# Patient Record
Sex: MALE | Race: BLACK OR AFRICAN AMERICAN | Employment: OTHER | URBAN - METROPOLITAN AREA
[De-identification: names, ages, dates, MRNs, and addresses within clinical notes are randomized per-mention and may not be internally consistent; named-entity substitution may affect disease eponyms.]

---

## 2020-09-25 ENCOUNTER — HOSPITAL ENCOUNTER (INPATIENT)
Facility: HOSPITAL | Age: 60
LOS: 12 days | Discharge: NURSING, REHAB OR LONG-TERM CARE | DRG: 377 | End: 2020-10-07
Attending: EMERGENCY MEDICINE | Admitting: INTERNAL MEDICINE
Payer: MEDICARE

## 2020-09-25 ENCOUNTER — APPOINTMENT (OUTPATIENT)
Dept: CT IMAGING | Facility: HOSPITAL | Age: 60
DRG: 377 | End: 2020-09-25
Attending: EMERGENCY MEDICINE
Payer: MEDICARE

## 2020-09-25 ENCOUNTER — APPOINTMENT (OUTPATIENT)
Dept: GENERAL RADIOLOGY | Facility: HOSPITAL | Age: 60
DRG: 377 | End: 2020-09-25
Attending: EMERGENCY MEDICINE
Payer: MEDICARE

## 2020-09-25 DIAGNOSIS — Z71.89 COUNSELING REGARDING ADVANCE CARE PLANNING AND GOALS OF CARE: ICD-10-CM

## 2020-09-25 DIAGNOSIS — K92.1 MELENA: ICD-10-CM

## 2020-09-25 DIAGNOSIS — I95.9 HYPOTENSION, UNSPECIFIED HYPOTENSION TYPE: ICD-10-CM

## 2020-09-25 DIAGNOSIS — C22.0 HEPATOCELLULAR CARCINOMA (HCC): ICD-10-CM

## 2020-09-25 DIAGNOSIS — K76.9 LIVER DISEASE: ICD-10-CM

## 2020-09-25 DIAGNOSIS — K72.10 CHRONIC LIVER FAILURE WITHOUT HEPATIC COMA (HCC): ICD-10-CM

## 2020-09-25 DIAGNOSIS — D68.8 HYPOFIBRINOGENEMIA (HCC): ICD-10-CM

## 2020-09-25 DIAGNOSIS — I95.89 HYPOTENSION DUE TO HYPOVOLEMIA: ICD-10-CM

## 2020-09-25 DIAGNOSIS — D65 DIC (DISSEMINATED INTRAVASCULAR COAGULATION) (HCC): ICD-10-CM

## 2020-09-25 DIAGNOSIS — G47.33 OSA ON CPAP: ICD-10-CM

## 2020-09-25 DIAGNOSIS — E87.5 HYPERKALEMIA: ICD-10-CM

## 2020-09-25 DIAGNOSIS — N17.9 AKI (ACUTE KIDNEY INJURY) (HCC): ICD-10-CM

## 2020-09-25 DIAGNOSIS — D62 ACUTE BLOOD LOSS ANEMIA: ICD-10-CM

## 2020-09-25 DIAGNOSIS — K62.5 RECTAL BLEEDING: Primary | ICD-10-CM

## 2020-09-25 DIAGNOSIS — E86.1 HYPOTENSION DUE TO HYPOVOLEMIA: ICD-10-CM

## 2020-09-25 DIAGNOSIS — N19 RENAL FAILURE, UNSPECIFIED CHRONICITY: ICD-10-CM

## 2020-09-25 DIAGNOSIS — D64.9 ANEMIA, UNSPECIFIED TYPE: ICD-10-CM

## 2020-09-25 DIAGNOSIS — R60.1 ANASARCA: ICD-10-CM

## 2020-09-25 DIAGNOSIS — N18.9 CHRONIC KIDNEY DISEASE, UNSPECIFIED CKD STAGE: ICD-10-CM

## 2020-09-25 DIAGNOSIS — Z51.5 PALLIATIVE CARE ENCOUNTER: ICD-10-CM

## 2020-09-25 DIAGNOSIS — R16.0 LIVER MASS, LEFT LOBE: ICD-10-CM

## 2020-09-25 DIAGNOSIS — Z99.89 OSA ON CPAP: ICD-10-CM

## 2020-09-25 DIAGNOSIS — D69.6 THROMBOCYTOPENIA (HCC): ICD-10-CM

## 2020-09-25 DIAGNOSIS — D68.9 COAGULOPATHY (HCC): ICD-10-CM

## 2020-09-25 PROCEDURE — 71045 X-RAY EXAM CHEST 1 VIEW: CPT | Performed by: EMERGENCY MEDICINE

## 2020-09-25 PROCEDURE — 74176 CT ABD & PELVIS W/O CONTRAST: CPT | Performed by: EMERGENCY MEDICINE

## 2020-09-25 PROCEDURE — 99291 CRITICAL CARE FIRST HOUR: CPT | Performed by: INTERNAL MEDICINE

## 2020-09-25 PROCEDURE — 30233N1 TRANSFUSION OF NONAUTOLOGOUS RED BLOOD CELLS INTO PERIPHERAL VEIN, PERCUTANEOUS APPROACH: ICD-10-PCS | Performed by: INTERNAL MEDICINE

## 2020-09-25 RX ORDER — GABAPENTIN 300 MG/1
300 CAPSULE ORAL 3 TIMES DAILY
Status: ON HOLD | COMMUNITY
End: 2020-10-05

## 2020-09-25 RX ORDER — ALBUTEROL SULFATE 90 UG/1
2 AEROSOL, METERED RESPIRATORY (INHALATION) EVERY 6 HOURS PRN
COMMUNITY

## 2020-09-25 RX ORDER — BUMETANIDE 0.25 MG/ML
2 INJECTION, SOLUTION INTRAMUSCULAR; INTRAVENOUS ONCE
Status: COMPLETED | OUTPATIENT
Start: 2020-09-25 | End: 2020-09-25

## 2020-09-25 RX ORDER — ALBUTEROL SULFATE 90 UG/1
2 AEROSOL, METERED RESPIRATORY (INHALATION) EVERY 6 HOURS PRN
Status: DISCONTINUED | OUTPATIENT
Start: 2020-09-25 | End: 2020-10-07

## 2020-09-25 RX ORDER — GUAIFENESIN 600 MG
600 TABLET, EXTENDED RELEASE 12 HR ORAL DAILY
Status: ON HOLD | COMMUNITY
End: 2020-10-05

## 2020-09-25 RX ORDER — MIRTAZAPINE 15 MG/1
15 TABLET, FILM COATED ORAL NIGHTLY
COMMUNITY

## 2020-09-25 RX ORDER — MORPHINE SULFATE 4 MG/ML
4 INJECTION, SOLUTION INTRAMUSCULAR; INTRAVENOUS EVERY 2 HOUR PRN
Status: DISCONTINUED | OUTPATIENT
Start: 2020-09-25 | End: 2020-10-07

## 2020-09-25 RX ORDER — MORPHINE SULFATE 2 MG/ML
1 INJECTION, SOLUTION INTRAMUSCULAR; INTRAVENOUS EVERY 2 HOUR PRN
Status: DISCONTINUED | OUTPATIENT
Start: 2020-09-25 | End: 2020-10-07

## 2020-09-25 RX ORDER — ASPIRIN 81 MG/1
81 TABLET ORAL DAILY
Status: ON HOLD | COMMUNITY
End: 2020-10-05

## 2020-09-25 RX ORDER — POTASSIUM CHLORIDE 1500 MG/1
TABLET, FILM COATED, EXTENDED RELEASE ORAL
Status: ON HOLD | COMMUNITY
End: 2020-10-05

## 2020-09-25 RX ORDER — METOPROLOL SUCCINATE 100 MG/1
100 TABLET, EXTENDED RELEASE ORAL DAILY
Status: ON HOLD | COMMUNITY
End: 2020-10-05

## 2020-09-25 RX ORDER — ALBUMIN (HUMAN) 12.5 G/50ML
25 SOLUTION INTRAVENOUS ONCE
Status: COMPLETED | OUTPATIENT
Start: 2020-09-25 | End: 2020-09-25

## 2020-09-25 RX ORDER — SODIUM BICARBONATE 150 MEQ/1,000 ML IN DEXTROSE 5 % INTRAVENOUS
83 SOLUTION CONTINUOUS
Status: DISCONTINUED | OUTPATIENT
Start: 2020-09-25 | End: 2020-09-27

## 2020-09-25 RX ORDER — ONDANSETRON 2 MG/ML
4 INJECTION INTRAMUSCULAR; INTRAVENOUS EVERY 6 HOURS PRN
Status: DISCONTINUED | OUTPATIENT
Start: 2020-09-25 | End: 2020-10-07

## 2020-09-25 RX ORDER — AMMONIUM LACTATE 12 G/100G
225 LOTION TOPICAL AS NEEDED
Status: ON HOLD | COMMUNITY
End: 2020-10-05

## 2020-09-25 RX ORDER — LIDOCAINE 50 MG/G
1 PATCH TOPICAL EVERY 24 HOURS
Status: ON HOLD | COMMUNITY
End: 2020-10-05

## 2020-09-25 RX ORDER — MAGNESIUM OXIDE 400 MG (241.3 MG MAGNESIUM) TABLET
420 TABLET DAILY
Status: ON HOLD | COMMUNITY
End: 2020-10-05

## 2020-09-25 RX ORDER — FUROSEMIDE 80 MG
80 TABLET ORAL DAILY
COMMUNITY

## 2020-09-25 RX ORDER — MORPHINE SULFATE 2 MG/ML
2 INJECTION, SOLUTION INTRAMUSCULAR; INTRAVENOUS EVERY 2 HOUR PRN
Status: DISCONTINUED | OUTPATIENT
Start: 2020-09-25 | End: 2020-10-07

## 2020-09-25 RX ORDER — METOCLOPRAMIDE HYDROCHLORIDE 5 MG/ML
10 INJECTION INTRAMUSCULAR; INTRAVENOUS EVERY 8 HOURS PRN
Status: DISCONTINUED | OUTPATIENT
Start: 2020-09-25 | End: 2020-09-27

## 2020-09-25 RX ORDER — DEXTROSE MONOHYDRATE 25 G/50ML
50 INJECTION, SOLUTION INTRAVENOUS
Status: DISCONTINUED | OUTPATIENT
Start: 2020-09-25 | End: 2020-10-07

## 2020-09-25 RX ORDER — MELATONIN
325
COMMUNITY

## 2020-09-25 RX ORDER — LACTULOSE 10 G/15ML
20 SOLUTION ORAL 3 TIMES DAILY
Status: ON HOLD | COMMUNITY
End: 2020-10-06

## 2020-09-25 RX ORDER — SODIUM CHLORIDE 9 MG/ML
INJECTION, SOLUTION INTRAVENOUS CONTINUOUS
Status: DISCONTINUED | OUTPATIENT
Start: 2020-09-25 | End: 2020-09-26

## 2020-09-25 NOTE — H&P
[facility]  History and Physical     Reji Vitor Patient Status:  Emergency    1960 MRN [id number]   Location [facility] Attending Larry Velazquez MD   Hosp Day # 0 PCP More Briceno MD     Chief Complaint •  gabapentin 300 MG Oral Cap, Take 300 mg by mouth 3 (three) times daily. , Disp: , Rfl:     •  guaiFENesin  MG Oral Tablet 12 Hr, Take 600 mg by mouth daily. , Disp: , Rfl:     •  lactulose 10 GM/15ML Oral Solution, Take 20 g by mouth 3 (three) t deficits. CNII-XII grossly intact. Musculoskeletal: Moves all extremities. Extremities: ++ edema in both legs and RUE  Integument: No rashes or lesions. Psychiatric: Appropriate mood and affect.       Diagnostic Data:      Labs:  Recent Labs   Lab 09/25

## 2020-09-25 NOTE — CONSULTS
NAME: Nataliya Loomis - ROOM: A2/A2 - MRN: [id number] - Age: 56year old - :  1960    Date of Admission: 2020 11:44 AM  Admission Diagnosis: No admission diagnoses are documented for this encounter.     Reason for Consult: GI bleed    History of (three) times daily. , Disp: , Rfl:     •  Metoprolol Succinate  MG Oral Tablet 24 Hr, Take 100 mg by mouth daily. , Disp: , Rfl:     •  rifaximin 550 MG Oral Tab, Take 550 mg by mouth 2 (two) times daily. , Disp: , Rfl:     •  mirtazapine 15 MG Oral Ta imaging. Minimal bibasilar opacities suggestive of atelectasis. ASSESSMENT/PLAN:  1. GI bleed   - Getting 2 units pRBCs. S/p K-centra  - GI On consult   - Serial H/H  - IV PPI BID  - Discuss EGD/c-scope when INR < 2  2.  Hypotension   - 2/2 GI bleed  - I

## 2020-09-25 NOTE — CONSULTS
[facility]                       Gastroenterology Consultation-[facility]    [facility] Patient Status:  Emergency    1960 MRN [id number]   Location [facility] [facility] Attending Alice Rondon, Maren Scheuermann, MD Tresea Spencer due for colonoscopy her his wife. His labs upon arrival are: Hgb 8.1, HCT 24.7, PLT 62, WBC 8.7, BUN 37, Creatinine 4.45, K 5.7, PT 58.1, INR 6.48. His last dose of Eliquis was this morning. He has CT abdomen ordered.  He has a R greater toe wound from re homicidal ideation           Oncologic: The patient reports no history of prior solid tumor or hematologic malignancy           ENT: The patient reports no hoarseness of voice, hearing loss, sinus congestion, tinnitus           Neurologic: +history of hepa 32.8   RDW 21.5*   NEPRELIM 4.26   WBC 8.7   PLT 62.0*       Recent Labs   Lab 09/25/20  1154   ALT 43   *       Imaging: Not available for review  Impression:     1.  This is a 57 y/o M with h/o T2DM, HTN, Hepatocellular carcinoma s/p radiation 2017 baseline per wife   -We could consider reversal of Eliquis however recent upper extremity DVT, therefore would be worried about hypercoagulable state created by reversal and propagation of DVT and possible risk of PE  -Therefore for now we will monitor frank

## 2020-09-25 NOTE — ED INITIAL ASSESSMENT (HPI)
Pt aox4. Pt presents to ed from home per [facility]. Pt c/o rectal bleeding x couple days with DEYVI. Pt had recent rt great toe surgery approx 1 month ago.

## 2020-09-25 NOTE — ED NOTES
Dr. Cris Sahni at pateints bedside and evaluating patient. Dr. Cris Sahni unwrapped patients rt great toe dressing. RN received verbal order from Dr. Cris Sahni not to redress wound and to have ICU evaluate rt great toe and redress wound.  [facility]

## 2020-09-25 NOTE — ED PROVIDER NOTES
Patient Seen in: [facility] Emergency Department      History   Patient presents with:  GI Bleeding    Stated Complaint: GI bleed    HPI    Patient is a 53-year-old male, presenting for evaluation of bloody stool.     Wife called for paramedics, monae respirations are unlabored. HEART: Regular rate and rhythm. There are no rubs or gallops. ABDOMEN: The abdomen is soft, obese, nontender. Bowel sounds present. SKIN: Skin is warm and dry. There are no rashes.    EXTREMITIES: The patient moves all 4 ext Status                     ---------                               -----------         ------                     CBC W/ DIFFERENTIAL[[id number]]          Abnormal            Final result                 Please view results for these tests on the indivi laboratory for further evaluation. The patient was attached to a cardiac monitor. An EKG was obtained and reviewed as noted above. Patient was observed while the laboratory and radiology studies were obtained. IV Protonix was administered.   IV fluids init Admission  Date Reviewed: 9/25/2020          ICD-10-CM Noted POA    * (Principal) Hypotension due to hypovolemia I95.89, E86.1 9/25/2020 Unknown    Acute blood loss anemia D62 9/25/2020 Unknown    RASHAD (acute kidney injury) ([facility] [address] N17.9 9/25/2020 Unknown

## 2020-09-25 NOTE — CONSULTS
[facility]  Report of Consultation    [facility] Patient Status:  Emergency    1960 MRN [id number]   Location 6[address] [facility] Attending Tonia Hicks MD   Hosp Day # 0 PCP Hosea Strange MD     Reason for [facility] SERVICES, [facility] ([facility]) Take 20 g by mouth 3 (three) times daily. •  Metoprolol Succinate  MG Oral Tablet 24 Hr, Take 100 mg by mouth daily. •  rifaximin 550 MG Oral Tab, Take 550 mg by mouth 2 (two) times daily.     •  mirtazapine 15 MG Oral Tab, Take 15 mg by mouth n Data:  Lab Results   Component Value Date    WBC 8.7 09/25/2020    HGB 8.1 09/25/2020    HCT 24.7 09/25/2020    PLT 62.0 09/25/2020    CREATSERUM 4.45 09/25/2020    BUN 37 09/25/2020     09/25/2020    K 5.7 09/25/2020     09/25/2020    CO2 24. 0 minutes      Thank you for allowing me to participate in the care of your patient. Please do not hesitate to call with any questions or concerns.        Breanna Stoddard  9/25/2020  1:48 PM

## 2020-09-26 ENCOUNTER — APPOINTMENT (OUTPATIENT)
Dept: CV DIAGNOSTICS | Facility: HOSPITAL | Age: 60
DRG: 377 | End: 2020-09-26
Attending: INTERNAL MEDICINE
Payer: MEDICARE

## 2020-09-26 PROBLEM — Z99.89 OSA ON CPAP: Status: ACTIVE | Noted: 2020-09-26

## 2020-09-26 PROBLEM — G47.33 OSA ON CPAP: Status: ACTIVE | Noted: 2020-09-26

## 2020-09-26 PROCEDURE — 30233M1 TRANSFUSION OF NONAUTOLOGOUS PLASMA CRYOPRECIPITATE INTO PERIPHERAL VEIN, PERCUTANEOUS APPROACH: ICD-10-PCS | Performed by: INTERNAL MEDICINE

## 2020-09-26 PROCEDURE — 93306 TTE W/DOPPLER COMPLETE: CPT | Performed by: INTERNAL MEDICINE

## 2020-09-26 PROCEDURE — 30233R1 TRANSFUSION OF NONAUTOLOGOUS PLATELETS INTO PERIPHERAL VEIN, PERCUTANEOUS APPROACH: ICD-10-PCS | Performed by: INTERNAL MEDICINE

## 2020-09-26 PROCEDURE — 99233 SBSQ HOSP IP/OBS HIGH 50: CPT | Performed by: INTERNAL MEDICINE

## 2020-09-26 PROCEDURE — 99291 CRITICAL CARE FIRST HOUR: CPT | Performed by: INTERNAL MEDICINE

## 2020-09-26 PROCEDURE — 99223 1ST HOSP IP/OBS HIGH 75: CPT | Performed by: SPECIALIST

## 2020-09-26 PROCEDURE — 99221 1ST HOSP IP/OBS SF/LOW 40: CPT | Performed by: INTERNAL MEDICINE

## 2020-09-26 RX ORDER — FOLIC ACID 1 MG/1
1 TABLET ORAL DAILY
Status: DISCONTINUED | OUTPATIENT
Start: 2020-09-26 | End: 2020-10-07

## 2020-09-26 RX ORDER — ACETAMINOPHEN 325 MG/1
650 TABLET ORAL ONCE
Status: COMPLETED | OUTPATIENT
Start: 2020-09-26 | End: 2020-09-26

## 2020-09-26 RX ORDER — ALBUMIN (HUMAN) 12.5 G/50ML
25 SOLUTION INTRAVENOUS EVERY 8 HOURS
Status: DISCONTINUED | OUTPATIENT
Start: 2020-09-26 | End: 2020-10-04

## 2020-09-26 RX ORDER — SODIUM CHLORIDE 9 MG/ML
INJECTION, SOLUTION INTRAVENOUS ONCE
Status: COMPLETED | OUTPATIENT
Start: 2020-09-26 | End: 2020-09-26

## 2020-09-26 RX ORDER — DIPHENHYDRAMINE HYDROCHLORIDE 50 MG/ML
25 INJECTION INTRAMUSCULAR; INTRAVENOUS ONCE
Status: COMPLETED | OUTPATIENT
Start: 2020-09-26 | End: 2020-09-26

## 2020-09-26 NOTE — PLAN OF CARE
Received pt 1700, tolerating 2l nc, wife at bedside, r great toe surgical incision, clean/dry/[place]. 2nd unit of prbcs infusing, ivf started, accucheck obtained, cc notified of consistently low sbp, orders received. At time of note, no episodes of stool.

## 2020-09-26 NOTE — CONSULTS
[facility]    Report of Consultation    Stacy Rea Patient Status:  Inpatient    1960 MRN [id number]   [facility] 4SW-A Attending Roderick Martinez MD   Hosp Day # 1 PCP Kaylee Doran MD     Date of Admission:  2020 Sulfate  (90 Base) MCG/ACT inhaler 2 puff, 2 puff, Inhalation, Q6H PRN  •  ondansetron HCl (ZOFRAN) injection 4 mg, 4 mg, Intravenous, Q6H PRN  •  Metoclopramide HCl (REGLAN) injection 10 mg, 10 mg, Intravenous, Q8H PRN  •  morphINE sulfate (PF) 2 M dry.     Laboratories and Data:  Diagnostics:  EKG: sinus with no ischemic change  Echo: pending  Stress Test:   Cath:   CTA Chest: as above  CXR: right basilar atelectasis    Labs:   Lab Results   Component Value Date    WBC 6.2 09/26/2020    RBC 2.65 09/

## 2020-09-26 NOTE — PLAN OF CARE
Received pt this morning oriented x4. Tolerating 2L nasal cannula with adequate sats. Vitals stable. Cardiology consulted for abnormal CT scan, Dr. Nereida Cook at bedside. No acute cardiac issues at this time. Consulted hematology for coagulopathy.  Transfused

## 2020-09-26 NOTE — PROGRESS NOTES
[facility]  Nephrology Progress Note    [facility] Attending:  Bella Jones MD       Assessment and Plan:    1) RASHAD / CKD- baseline labs unknown; likely due to longstanding DM / HTN +/- hepatorenal syndrome exac by hypotension / blood loss; beau 09/26/2020     09/26/2020    CO2 24.0 09/26/2020     09/26/2020    CA 9.3 09/26/2020    ALB 1.1 09/26/2020    ALKPHO 122 09/26/2020    BILT 4.3 09/26/2020    TP 4.7 09/26/2020     09/26/2020    ALT 45 09/26/2020    PTT 83.0 09/25/2020 bedside.           Larry Mckeon  9/26/2020  7:10 AM

## 2020-09-26 NOTE — PROGRESS NOTES
[facility] Pharmacy Note: Antimicrobial Weight Based Dose Adjustment for: ceftriaxone (ROCEPHIN)    Digna Brand is a 56year old patient who has been prescribed ceftriaxone (ROCEPHIN) 1 gm every 24 hours.     Estimated Creatinine Clearance: 15.3 mL/min (A) (ba

## 2020-09-26 NOTE — PROGRESS NOTES
Critical Care Progress Note     Assessment / Plan:  1. GI bleed   - s/p 2 units pRBCs. S/p K-centra  - GI On consult   - Serial H/H  - IV PPI BID  - Octreotide gtt  - Ceftriaxone for SBP prophylaxis   - Plan for possible scope tomorrow, per GI  2.  Hypotens

## 2020-09-26 NOTE — PROGRESS NOTES
Gastroenterology Progress Note  Patient Name: Ethel Jiménez  Chief Complaint: hematochezia, HCV cirrhosis, HCC  S: Pt with one maroon BM overnight. He denies abdominal pain.    O: BP 98/38   Pulse 73   Temp 99 °F (37.2 °C)   Resp 11   Ht 68\"   Wt (!) 366 suspect lower GI bleed at this time. 3. Mild encephalopathy likely from #2.   4. Elevated LFT  5. Recent right toe infection - per hospitalist  6. HTN  7. RUE DVT     Recommendations:   1. Hold Eliquis if ok with hematology/primary  2.  Serial H/H, okay to

## 2020-09-26 NOTE — PROGRESS NOTES
Initiated patient on cpap per MD order. cpap [id number] with 2 lpm nasal cannula. Current SpO2 96%. RN aware.       09/26/20 0209   BiPAP   $ RT Standby Charge (per 15 min) 3   $ FELIX set up charge Yes   Device RemStar - CPAP   BiPAP / CPAP CE# [id number]   Mode Spo

## 2020-09-26 NOTE — VASCULAR ACCESS
Consulted to place a PICC line. Platelet count is too low for a bedside PICC line placement. Bedside RN Ariadne Bingham called and Recommendation to consult IR discussed. Will Dc consult for vascular access at this time.

## 2020-09-26 NOTE — PLAN OF CARE
Received pt. In bed on 2L NC. Has a few apneic periods. When asked he stated he has sleep apnea and wears a CPAP machine at home. RT notified. Pt. Placed on Autopap and doing well. Lungs diminished. Did receive Albumin for hypotension.   Has denied an

## 2020-09-26 NOTE — PROGRESS NOTES
[facility]  Progress Note     Shira Ray Patient Status:  Inpatient    1960 MRN [id number]   [place] 4SW-A Attending Geovanna Crenshaw MD   Hosp Day # 1 PCP Jeremy Hart MD     Chief Complaint: \" I dont like the bed\" Epic.    Medications:   • Insulin Aspart Pen  1-5 Units Subcutaneous Q6H   • cefTRIAXone  2 g Intravenous Q24H   • Albumin Human  25 g Intravenous Q8H   • sodium phosphate IVPB  20.001 mmol Intravenous Once   • polyethylene glycol  4,000 mL Oral Once   • p state of illness, I anticipate that, after discharge, patient will require TBD.

## 2020-09-26 NOTE — RESPIRATORY THERAPY NOTE
FELIX : EQUIPMENT USE: DAILY SUMMARY                                            SET MODE:AUTO CPAP WITH CFLEX                                          USAGE IN HOURS:5:42                                          90%

## 2020-09-26 NOTE — CONSULTS
[facility] Report of Consultation      Patient Name: Dax Briceño   YOB: 1960  Medical Record Number: [id number]  Consulting Physician: Gudelia Chau. Leilani Clark M.D.    Referring Physician: Maria Del Rosario Lopez MD    Date of Consult is decreased compared to before admission. Only previous labs available to me are from 03/2018 when platelets were 994, bilirubin 1.4, albumin 2.0, INR 1.6. Patient and his significant other are poor historians.  They can provide me with very little Intravenous, Q8H    •  sodium phosphate 20.001 mmol in sodium chloride 0.9% 100 mL IVPB, 20.001 mmol, Intravenous, Once    •  polyethylene glycol (GOLYTELY) solution 4,000 mL, 4,000 mL, Oral, Once    •  [COMPLETED] Pantoprazole Sodium (PROTONIX) 40 mg in S reactions. Eyes No significant visual changes. ENMT No oral pain, sore throat, epistaxis, hearing changes. Hematologic/Lymphatic No tender or enlarged lymph nodes; no easy bruising or bleeding.   Respiratory No dyspnea, cough, hemoptysis, pleuritic chest Glucose 72 70 - 99 mg/dL   POCT GLUCOSE    Collection Time: 09/25/20  9:14 PM   Result Value Ref Range    POC Glucose 63 (L) 70 - 99 mg/dL   POCT GLUCOSE    Collection Time: 09/25/20  9:43 PM   Result Value Ref Range    POC Glucose 117 (H) 70 - 99 mg/dL 5.1 mmol/L    Chloride 117 (H) 98 - 112 mmol/L    CO2 24.0 21.0 - 32.0 mmol/L    Anion Gap 2 0 - 18 mmol/L    BUN 43 (H) 7 - 18 mg/dL    Creatinine 4.84 (H) 0.70 - 1.30 mg/dL    BUN/CREA Ratio 8.9 (L) 10.0 - 20.0    Calcium, Total 9.3 8.5 - 10.1 mg/dL    C Negative mg/dl    Urobilinogen Urine <2.0 0.2 - 2.0 mg/dL    Nitrite Urine Negative Negative    Leukocyte Esterase Urine Trace (A) Negative    WBC Urine 5-10 (A) <5 /HPF    RBC URINE 0-2 0 - 2 /HPF    Bacteria Urine None Seen None Seen    Squamous Epi.  Cherri cholecystectomy. As noted above, there is questionable left lobe intrahepatic biliary ductal dilatation with a suspected underlying hypodense mass. The CBD is not dilated. PANCREAS:  Mild diffuse pancreatic atrophy. No focal pancreatic mass lesions.   [facility] Within the abdomen, there is suggestion of a large hypodense focus within the left lobe of the liver measuring 5.5 x 4.7 cm. Suspicion for associated biliary ductal dilatation within the left lobe of the liver.   An underlying mass is of concern and   furt FFP may be a better option with regard to the coagulopathy because of the other components present.  However, FFP would involve a larger volume which is significant in this patient with significant edema and it carries a risk of TRALI in this patient with u suggest holding off. Patient should be monitored in the ICU and appropriate interventions taken in the event of more severe bleeding.  His bleeding started after he began anticoagulation so it is very possible that as these drugs leave his system the bleedi

## 2020-09-27 PROCEDURE — 99291 CRITICAL CARE FIRST HOUR: CPT | Performed by: INTERNAL MEDICINE

## 2020-09-27 PROCEDURE — 99233 SBSQ HOSP IP/OBS HIGH 50: CPT | Performed by: INTERNAL MEDICINE

## 2020-09-27 PROCEDURE — 99233 SBSQ HOSP IP/OBS HIGH 50: CPT | Performed by: SPECIALIST

## 2020-09-27 PROCEDURE — 99232 SBSQ HOSP IP/OBS MODERATE 35: CPT | Performed by: INTERNAL MEDICINE

## 2020-09-27 RX ORDER — MIDODRINE HYDROCHLORIDE 5 MG/1
5 TABLET ORAL 3 TIMES DAILY
Status: DISCONTINUED | OUTPATIENT
Start: 2020-09-27 | End: 2020-10-07

## 2020-09-27 RX ORDER — METOCLOPRAMIDE HYDROCHLORIDE 5 MG/ML
5 INJECTION INTRAMUSCULAR; INTRAVENOUS EVERY 8 HOURS PRN
Status: DISCONTINUED | OUTPATIENT
Start: 2020-09-27 | End: 2020-10-07

## 2020-09-27 RX ORDER — SODIUM CHLORIDE 9 MG/ML
INJECTION, SOLUTION INTRAVENOUS CONTINUOUS
Status: ACTIVE | OUTPATIENT
Start: 2020-09-27 | End: 2020-09-27

## 2020-09-27 RX ORDER — ONDANSETRON 2 MG/ML
4 INJECTION INTRAMUSCULAR; INTRAVENOUS EVERY 4 HOURS PRN
Status: DISCONTINUED | OUTPATIENT
Start: 2020-09-27 | End: 2020-09-27

## 2020-09-27 NOTE — PROGRESS NOTES
[facility]  Nephrology Progress Note    Shira Ha Attending:  Geovanna Crenshaw MD       Assessment and Plan:    1) RASHAD / CKD- baseline labs unknown; likely due to longstanding DM / HTN +/- hepatorenal syndrome exac by hypotension / blood loss; uri HCT 25.1 09/27/2020    PLT 79.0 09/27/2020    CREATSERUM 4.60 09/27/2020    BUN 46 09/27/2020     09/27/2020    K 3.9 09/27/2020     09/27/2020    CO2 29.0 09/27/2020     09/27/2020    CA 9.5 09/27/2020    ALB 2.1 09/27/2020    ALKPHO 11 (PROTONIX) 40 mg in Sodium Chloride 0.9 % 10 mL IV push, 40 mg, Intravenous, Q12H    •  sodium bicarbonate 150mEq in dextrose 5% 1000mL premix infusion, 83 mL/hr, Intravenous, Continuous          Questions/concerns were discussed with patient and/or family

## 2020-09-27 NOTE — PROGRESS NOTES
Gastroenterology Progress Note  Patient Name: Nataliya Loomis  Chief Complaint: hematochezia, HCV cirrhosis, [address]  S: Pt denies abdominal pain.  Per nursing, no hematochezia or melena overnight but had a small maroon BM this am.   O: /49   Pulse 63   T DDx includes PUD, AVMs, variceal bleed, colitis, etc  3. Mild encephalopathy: resolved  4. Elevated LFT  5. Recent right toe infection - per hospitalist  6. HTN  7. RUE DVT     Recommendations:   1. Hold Eliquis if ok with hematology/primary  2.  Serial H/H

## 2020-09-27 NOTE — PROGRESS NOTES
Critical Care Progress Note     Assessment / Plan:  1. GI bleed   - s/p 2 units pRBCs. S/p K-centra  - GI On consult   - Serial H/H  - IV PPI BID  - Octreotide gtt  - Ceftriaxone for SBP prophylaxis   - No plans for intervention today   2.  Hypotension: Res

## 2020-09-27 NOTE — PROGRESS NOTES
[facility]  Progress Note     Marleen Ruiz Patient Status:  Inpatient    1960 MRN [id number]   [facility] 8NE-A Attending Alyx Moore MD   Hosp Day # 2 PCP Dulce Monteiro MD     Chief Complaint: edema     S: Patient com 4.8*  --  4.7*  --  5.3*       Estimated Creatinine Clearance: 16.1 mL/min (A) (based on SCr of 4.6 mg/dL (H)).     Recent Labs   Lab 09/26/20  0436 09/26/20  1421 09/27/20  0447   PTP 34.1* 38.4* 43.2*   INR 3.27* 3.81* 4.43*       Recent Labs   Lab 09/25/

## 2020-09-27 NOTE — RESPIRATORY THERAPY NOTE
FELIX : EQUIPMENT USE: DAILY SUMMARY                                            SET MODE:                                          USAGE IN HOURS:                                          90% EXP. PRESSURE(EPAP):

## 2020-09-27 NOTE — PROGRESS NOTES
[facility] Progress Note      Patient Name: Dax Briceño   YOB: 1960  Medical Record Number: [id number]      Subjective  No bleeding overnight or this morning.      Current Medications   •  Insulin Aspart Pen ([place] Spoon) injection 4 mg, 4 mg, Intravenous, Q6H PRN    •  Metoclopramide HCl (REGLAN) injection 10 mg, 10 mg, Intravenous, Q8H PRN    •  morphINE sulfate (PF) 2 MG/ML injection 1 mg, 1 mg, Intravenous, Q2H PRN    Or    •  morphINE sulfate (PF) 2 MG/ML injection 2 m 2:21 PM   Result Value Ref Range    Fibrinogen 78 (L) 200 - 446 mg/dl   HEPARIN ANTI-XA    Collection Time: 09/26/20  2:21 PM   Result Value Ref Range    Heparin Anti Xa Assay 0.89 IU/mL   PROTHROMBIN TIME (PT)    Collection Time: 09/26/20  2:21 PM   Resul Absolute 0.79 0.10 - 1.00 x10(3) uL    Eosinophil Absolute 0.41 0.00 - 0.70 x10(3) uL    Basophil Absolute 0.06 0.00 - 0.20 x10(3) uL    Immature Granulocyte Absolute 0.02 0.00 - 1.00 x10(3) uL    Neutrophil % 48.6 %    Lymphocyte % 31.6 %    Monocyte % 12 2.6 mg/dL   PHOSPHORUS    Collection Time: 09/27/20  4:47 AM   Result Value Ref Range    Phosphorus 2.8 2.5 - 4.9 mg/dL   AFP, TUMOR MARKER, SERUM    Collection Time: 09/27/20  4:47 AM   Result Value Ref Range    AFP Tumor Marker 1.2 <8.0 ng/mL   PTT, ACTI studies from 2018 show reasonably good synthetic function. With poor hepatic function, one can expect abnormal PT and PTT. Patient also has been on apixaban 10 mg BID since his discharge on 09/23/2020 with the last dose given yesterday.  At least 75% of api setting of severe liver disease. Laboratory and imaging records from last two years from [facility] and [address] should be obtained. Electronically signed by:    Radha Castillo M.D.   Associate Medical Director of Oncology Services  [facility]

## 2020-09-27 NOTE — PROGRESS NOTES
Reviewed overnight course with nursing staff - no obvious bleeding overnight    Afebrile  134/53   64 regular    Lungs clear anteriorly - sleeping with CPAP device in place  Ht RRR  abd obese  Ext right great toe amputation    INR 4.4   Hgb 8.1  plts 79

## 2020-09-27 NOTE — PROGRESS NOTES
Pharmacy Note: Renal dose adjustment for Metoclopramide (Reglan)  Reji Adler has been prescribed Metoclopramide (Reglan) 10 mg every 8 hours as needed. Estimated Creatinine Clearance: 16.1 mL/min (A) (based on SCr of 4.6 mg/dL (H)).     His calculat

## 2020-09-27 NOTE — PLAN OF CARE
Received pt. On 2L NC and in bed. Lungs diminshed. Received a dose of Vit K and one unit of cryo last night. See lab results. Bicarb drip continues. Denied any pain. Urine output remains dark and hazy in color. Oxtritide drip continues.   No stools

## 2020-09-28 PROBLEM — Z71.89 COUNSELING REGARDING ADVANCE CARE PLANNING AND GOALS OF CARE: Status: ACTIVE | Noted: 2020-09-28

## 2020-09-28 PROBLEM — Z51.5 PALLIATIVE CARE ENCOUNTER: Status: ACTIVE | Noted: 2020-09-28

## 2020-09-28 PROCEDURE — 99232 SBSQ HOSP IP/OBS MODERATE 35: CPT | Performed by: INTERNAL MEDICINE

## 2020-09-28 PROCEDURE — 99233 SBSQ HOSP IP/OBS HIGH 50: CPT | Performed by: INTERNAL MEDICINE

## 2020-09-28 PROCEDURE — 99222 1ST HOSP IP/OBS MODERATE 55: CPT | Performed by: NURSE PRACTITIONER

## 2020-09-28 PROCEDURE — 99233 SBSQ HOSP IP/OBS HIGH 50: CPT | Performed by: SPECIALIST

## 2020-09-28 RX ORDER — FUROSEMIDE 10 MG/ML
40 INJECTION INTRAMUSCULAR; INTRAVENOUS
Status: DISCONTINUED | OUTPATIENT
Start: 2020-09-28 | End: 2020-09-29

## 2020-09-28 NOTE — PROGRESS NOTES
Gastroenterology Progress Note  Laurencey Gave Patient Status:  Inpatient    1960 MRN [id number]   [facility] 8NE-A Attending Babak Silva MD   Hosp Day # 3 PCP Yue Lopez MD recent DVT on Eliquis admitted 9/25 with melena in setting of a multi-factorial coagulopathy (Eliquis + hepatic dysfunction with possible DIC).  Currently without overt GI bleeding (Hgb slightly lower however still above 7) and hematology feels that miles

## 2020-09-28 NOTE — PROGRESS NOTES
[facility]  Progress Note     Lidia Aleman Patient Status:  Inpatient    1960 MRN [id number]   [facility] 8NE-A Attending Crystal Estrella MD   Hosp Day # 3 PCP Keira Taylor MD     Chief Complaint: \" I feel fine\"    S: P 45  --  37 28   BILT 4.3* 4.7* 4.8* 5.1*   TP 4.7*  --  5.3* 4.9*       Estimated Creatinine Clearance: 18.8 mL/min (A) (based on SCr of 3.94 mg/dL (H)).     Recent Labs   Lab 09/26/20  1421 09/27/20  0447 09/28/20  0637   PTP 38.4* 43.2* 44.8*   INR 3.81* MD

## 2020-09-28 NOTE — CM/SW NOTE
09/28/20 1400   CM/SW Referral Data   Referral Source Social Work (self-referral)   Reason for Referral Discharge planning   Informant Patient; Children   Patient Info   Patient's Mental Status Alert;Oriented   Patient's Home Environment [address] Insurance [employer] will have to be obtained prior to discharge if [facility] is the disposition.  &  to remain available and supportive for discharge planning needs.

## 2020-09-28 NOTE — PLAN OF CARE
Assumed care for pt at 19:30 on 9/27    A&Ox4  Denies pain or sob  VSS on 2L, spO2 98%  NSR on tele  Cpap HS,   Ramos draining  Sandostatin infusing, IV protonix given, albumin infused  Full liquid diet, accucheck qid    Plan: Strict I&O, AM labs, monit

## 2020-09-28 NOTE — PROGRESS NOTES
[facility] Progress Note      Patient Name: Maxwell Santos   YOB: 1960  Medical Record Number: [id number]      Subjective  No bleeding overnight or this morning.      Current Medications   •  phytonadione (AQUA-MEPHYTON) [COMPLETED] Prothrombin Complex Conc Human (KCENTRA) 4,769 Units in 180 mL infusion, 4,769 Units, Intravenous, Once    •  Albuterol Sulfate  (90 Base) MCG/ACT inhaler 2 puff, 2 puff, Inhalation, Q6H PRN    •  ondansetron HCl (ZOFRAN) injection 4 mg, past 24 hour(s))   POCT GLUCOSE    Collection Time: 09/27/20 11:33 AM   Result Value Ref Range    POC Glucose 96 70 - 99 mg/dL   POCT GLUCOSE    Collection Time: 09/27/20  3:55 PM   Result Value Ref Range    POC Glucose 122 (H) 70 - 99 mg/dL   HEPARIN ANTI PTT, ACTIVATED    Collection Time: 09/28/20  6:37 AM   Result Value Ref Range    .1 (H) 25.4 - 36.1 seconds   FIBRINOGEN ACTIVITY    Collection Time: 09/28/20  6:37 AM   Result Value Ref Range    Fibrinogen 66 (L) 200 - 446 mg/dl   HEPARIN ANTI-XA 75% of apixaban is metabolized by the liver. In the setting of hepatic dysfunction, one can expect a longer half-life. Finally, at least low grade DIC can be present in the setting of chronic liver disease.  Anti-Xa level was therapeutic; in cirrhosis, expe evaluation until there is a better understanding of his coagulopathy now that it does not appear that he is bleeding significantly. Electronically signed by:    Chun Petit M.D.   Associate Medical Director of Oncology [facility],

## 2020-09-28 NOTE — PAYOR COMM NOTE
--------------  ADMISSION REVIEW     Payor: [id number] #:  [id number]  Authorization Number: [id number]    Admit date: 9/25/20  Admit time: 1700       Patient Seen in: [facility] Emergency Department    History   Patient present (*)      (*)     Alkaline Phosphatase 166 (*)     Bilirubin, Total 2.3 (*)     Total Protein 4.8 (*)     Albumin 0.9 (*)     A/G Ratio 0.2 (*)     All other components within normal limits   PROTHROMBIN TIME (PT) - Abnormal; Notable for the followin for gastroenterology. 2 units of packed red blood cells was ordered for transfusion. Asad Loraine was ordered for reversal of anticoagulation. CT of the abdomen and pelvis, without contrast, was requested and ordered.     Given the patient's relative hypotens bruits. Respiratory: Clear to auscultation bilaterally. No wheezes. No rhonchi. Cardiovascular: S1, S2. Regular rate and rhythm. No murmurs, rubs or gallops. Equal pulses. Chest and Back: No tenderness or deformity.   Abdomen: Soft, nontender, distended kg)      Physical Exam:    Respiratory: Clear to auscultation bilaterally. No wheezes. No rhonchi. Cardiovascular: S1, S2. Regular rate and rhythm. No murmurs, rubs or gallops. Abdomen: Soft, nontender, ND  Neurologic: No focal neurological deficits. 1. Echo today   2.  D/w Dr Nereida Cook who will see pt today.      Quality:  · DVT Prophylaxis: SCD      NEPHROLOGY:   Assessment and Plan:     1) RASHAD / CKD- baseline labs unknown; likely due to longstanding DM / HTN +/- hepatorenal syndrome exac by gregi JVD  Lungs - CTAB, no wheezing   CV - Normal, normal s1 and s2, No murmurs, rubs or gallops, No P2  Abd - +BS, soft, non-tender, non-distended  EXT - Normal movement, No LE edema bilaterally   Skin - No new rashes or ulcerations  Neuro - CN 2-12 grossly in 9/28/2020 0556 New Bag 25 g Intravenous     9/27/2020 2206 New Bag 25 g Intravenous     9/27/2020 1509 New Bag 25 g Intravenous       cefTRIAXone Sodium (ROCEPHIN) 2 g in sodium chloride 0.9% 100 mL MBP/ADD-vantage     Date Action Dose Route     9/28/2020

## 2020-09-28 NOTE — PLAN OF CARE
Problem: Impaired Activities of Daily Living  Goal: Achieve highest/safest level of independence in self care  Description: Interventions:  - Assess ability and encourage patient to participate in ADLs to maximize function  - Promote sitting position [facility]

## 2020-09-28 NOTE — PHYSICAL THERAPY NOTE
PHYSICAL THERAPY EVALUATION - INPATIENT     Room Number: 2143/3363-J  Evaluation Date: 9/28/2020  Type of Evaluation: Initial  Physician Order: PT Eval and Treat    Presenting Problem: GI bleed  Reason for Therapy: Mobility Dysfunction and Discharge rate  Location: R great toe  Management Techniques: Activity promotion; Body mechanics; Relaxation;Repositioning    COGNITION  · Memory:  appears intact  · Following Commands:  follows all commands and directions without difficulty  · Safety Judgement:  good walker  Pattern: (decreased weight shifting bilaterally)          Skilled Therapy Provided: Pt received supine in bed, agreeable to PT/OT session. Pt demos supine to sit with min A for trunk from fully elevated HOB.   2 post op shoes obtained for pt as non should achieve supervision level in all mobility. DISCHARGE RECOMMENDATIONS  PT Discharge Recommendations: Sub-acute rehabilitation(ELOS 7-10 days)    PLAN  PT Treatment Plan: Bed mobility; Body mechanics; Endurance; Energy conservation;Patient education;F

## 2020-09-28 NOTE — CONSULTS
[address] [facility] Patient Status:  Inpatient    1960 MRN [id number]   [facility] 8NE-A Attending Jose Castillo MD   Hosp Day # 3 PCP Lilibeth Romero MD     Date of Consult: 20 (NOVOLOG) 100 UNIT/ML flexpen 1-5 Units, 1-5 Units, Subcutaneous, TID CC and HS  •  Midodrine HCl (PROAMATINE) tab 5 mg, 5 mg, Oral, TID  •  Metoclopramide HCl (REGLAN) injection 5 mg, 5 mg, Intravenous, Q8H PRN  •  [COMPLETED] OCTREOTIDE INFUSION BOLUS FR HGB 7.7 (L) 09/28/2020    HCT 22.6 (L) 09/28/2020    PLT 64.0 (L) 09/28/2020       Coags:   Lab Results   Component Value Date    INR 4.64 (H) 09/28/2020    .1 (H) 09/28/2020       Chemistry:  Lab Results   Component Value Date    CREATSERUM 3.94 (H Assist  Total Care Reduced Drowsy/confused   20 Bedbound Extensive Disease  Can't do any work Max Assist  Total Care Minimal Drowsy/confused   10 Bedbound/coma Extensive Disease  Can't do any work  coma  Max Assist  Total Care Mouth care Drowsy or coma   0 expressed understanding for plan to monitor coag studies, and agree to proceed with scope eventually if indicated/offered.       Both also expressed since appreciation and trust in medical providers during this hospital stay, and that they defer to our clin respective documents. POLST: Deferred. HCPOA:  No document - S.O. Derrick Bartholomew is HCPOA per pt and Derrick Bartholomew. She will attempt to locate document and provide copy for review and for record. If unable, both are receptive to creating new document.                Jaya Monroy course. A total of 50 minutes were spent on this consult, which included all of the following:  Direct face-to-face contact, history taking, physical examination, and > 50% was spent counseling and coordinating care.     Epic message to pt's RN Hetcor Mckeon

## 2020-09-28 NOTE — PROGRESS NOTES
[facility]  Nephrology Progress Note    Silvano Cameron Patient Status:  Inpatient    1960 MRN [id number]   [facility] 8NE-A Attending Akshat Gregory MD   Hosp Day # 3 PCP David Wynn MD       SUBJECTIVE:  Denies any ongoing --  4.84* 4.60* 3.94*   CA 9.6  --  9.3 9.5 9.2   MG  --   --  3.0* 2.7*  --    PHOS  --   --  2.2* 2.8 2.9   GLU 95  --  100* 121* 88       Recent Labs   Lab 09/25/20  1154 09/26/20  0436 09/27/20  0447 09/28/20  0637   ALT 43 45 37 28   * 140* 10 Pantoprazole Sodium (PROTONIX) 40 mg in Sodium Chloride 0.9 % 10 mL IV push, 40 mg, Intravenous, Q12H          Impression/Plan:    #1.   RASHAD/CKD-Baseline creatinine is unknown although he does have longstanding diabetes and hypertension and his wife reports

## 2020-09-28 NOTE — RESPIRATORY THERAPY NOTE
FELIX - Equipment Use Daily Summary:  · Set Mode AFLEX  · Usage in hours: 9:40  · 90% Pressure (EPAP) level: 8.0  · 90% Insp Pressure (IPAP):   · AHI: 6.9  · Supplemental Oxygen:   · Comments:

## 2020-09-28 NOTE — OCCUPATIONAL THERAPY NOTE
OCCUPATIONAL THERAPY EVALUATION - INPATIENT     Room Number: 8436/9366-U  Evaluation Date: 9/28/2020  Type of Evaluation: Initial  Presenting Problem: GI bleed, recent R great toe amputation 9/11/20    Physician Order: IP Consult to Occupational Therapy  R deficits    PERCEPTION  Overall Perception Status:   WFL - within functional limits    SENSATION  Light touch:  intact    Communication: clear    Behavioral/Emotional/Social: pleasant    RANGE OF MOTION AND STRENGTH ASSESSMENT  Upper extremity ROM is with[age] year old male admitted from home on 9/25 with rectal bleeding. Admitted for GI bleed, RASHAD, renal failure, hypotension. Pt was at [facility] for R great toe infection, R great toe amputation on 9/11/20, and R UE DVT. Pt was discharged home on 9/22.   C 3-5x/week  Number of Visits to Meet Established Goals: 5    ADL Goals   Patient will perform grooming: with setup  Patient will perform upper body dressing:  with setup  Patient will perform lower body dressing:  with min assist  Patient will perform toile

## 2020-09-29 ENCOUNTER — APPOINTMENT (OUTPATIENT)
Dept: GENERAL RADIOLOGY | Facility: HOSPITAL | Age: 60
DRG: 377 | End: 2020-09-29
Attending: NURSE PRACTITIONER
Payer: MEDICARE

## 2020-09-29 ENCOUNTER — APPOINTMENT (OUTPATIENT)
Dept: ULTRASOUND IMAGING | Facility: HOSPITAL | Age: 60
DRG: 377 | End: 2020-09-29
Attending: SPECIALIST
Payer: MEDICARE

## 2020-09-29 PROCEDURE — 99233 SBSQ HOSP IP/OBS HIGH 50: CPT | Performed by: INTERNAL MEDICINE

## 2020-09-29 PROCEDURE — 99231 SBSQ HOSP IP/OBS SF/LOW 25: CPT | Performed by: NURSE PRACTITIONER

## 2020-09-29 PROCEDURE — 30233K1 TRANSFUSION OF NONAUTOLOGOUS FROZEN PLASMA INTO PERIPHERAL VEIN, PERCUTANEOUS APPROACH: ICD-10-PCS | Performed by: INTERNAL MEDICINE

## 2020-09-29 PROCEDURE — 76700 US EXAM ABDOM COMPLETE: CPT | Performed by: SPECIALIST

## 2020-09-29 PROCEDURE — 73630 X-RAY EXAM OF FOOT: CPT | Performed by: NURSE PRACTITIONER

## 2020-09-29 PROCEDURE — 99233 SBSQ HOSP IP/OBS HIGH 50: CPT | Performed by: SPECIALIST

## 2020-09-29 PROCEDURE — 99232 SBSQ HOSP IP/OBS MODERATE 35: CPT | Performed by: INTERNAL MEDICINE

## 2020-09-29 RX ORDER — FUROSEMIDE 10 MG/ML
40 INJECTION INTRAMUSCULAR; INTRAVENOUS EVERY 8 HOURS
Status: DISCONTINUED | OUTPATIENT
Start: 2020-09-29 | End: 2020-09-30

## 2020-09-29 RX ORDER — SIMETHICONE 80 MG
80 TABLET,CHEWABLE ORAL
Status: DISCONTINUED | OUTPATIENT
Start: 2020-09-29 | End: 2020-10-07

## 2020-09-29 RX ORDER — SODIUM CHLORIDE 9 MG/ML
INJECTION, SOLUTION INTRAVENOUS ONCE
Status: COMPLETED | OUTPATIENT
Start: 2020-09-29 | End: 2020-09-30

## 2020-09-29 NOTE — PROGRESS NOTES
[address] [facility] 149 Follow Up    Silvano Cameron Patient Status:  Inpatient    1960 MRN [id number]   [facility] 8NE-A Attending Akshat Gregory MD   Hosp Day # 4 PCP David Wynn MD     Date of visit:  2020  Day 4 oriented x3 - was unclear re situation (US abdomen tonight). PSYCHIATRIC:  Resting in bed, mood and affect congruent with situation.     Palliative Performance Scale: 60%   Palliative Performance Scale   % Ambulation Activity Level Self-Care Intake Consci tomorrow, so will follow up with him when those results are available and reviewed by primary/consulting services have reviewed with him and Jason Poon.  Denise Ruiz is receptive to ongoing support of palliative care during this hospital stay, and pending course will course / workup. • Denise Ruiz agrees with current work up for [employer]. \" Receptive to palliative support when results and options are available for review. Code Status: Full Code. Not discussed - see above. POLST:  Deferred.     HCPOA / HC Surrogate:

## 2020-09-29 NOTE — CM/SW NOTE
Provided [facility] list to both pt and spouse. Provided them with visiting options and COVID stats. Wife is considering either [facility] or [place] d/t them being closer.  Requested that the liaisons reach out to the wife to discuss their facilities more in

## 2020-09-29 NOTE — CM/SW NOTE
Care Progression Note:  Active Acute Medical Issue:   Rectal bleeding     Other Contributing Medical Factors/Dx. :    Length of stay: 4  Avoidable Delays:   Discharge Barriers: scheduled EGD and colonscopy in AM under MAC. FFP to reverse anticoagulation.

## 2020-09-29 NOTE — PROGRESS NOTES
Gastroenterology Progress Note  Megan Rojo Patient Status:  Inpatient    1960 MRN [id number]   [facility] 8NE-A Attending Cy Hyatt MD   Hosp Day # 4 PCP Najma Clark MD 68* 59*     Assessment: 56 yr-old male with hx of HTN, DM, CKD, and HCV with [address]. s/p radiation with recent DVT on Eliquis admitted 9/25 with melena in setting of a multi-factorial coagulopathy (Eliquis + hepatic dysfunction with possible DIC).  Currently wi

## 2020-09-29 NOTE — PAYOR COMM NOTE
--------------  CONTINUED STAY REVIEW    Payor: Elvia  Subscriber #:  [id number]  Authorization Number: [id number]    Admit date: 9/25/20  Admit time: 1700    9/28/20  S: Patient denies CP/SOB/dizziness/abd pain, no rectal bleed per pt.    H 5.1*   TP 4.7*  --  5.3* 4.9*         Estimated Creatinine Clearance: 18.8 mL/min (A) (based on SCr of 3.94 mg/dL (H)).           Recent Labs   Lab 09/26/20  1421 09/27/20  0447 09/28/20  0637   PTP 38.4* 43.2* 44.8*   INR 3.81* 4.43* 4.64*             Rec bilaterally. No wheezes. No rhonchi. Cardiovascular: S1, S2. Regular rate and rhythm. No murmurs, rubs or gallops. Abdomen: Soft, nontender, nondistended. Positive bowel sounds. No rebound or guarding. Neurologic: No focal neurological deficits.    Tangela Gregory on exam, no drainage   11. Hx of HTN   12. Right arm DVT- hold AC for now   13. Pericardial lipoma-   1. Echo unremarkable. 2. No further cardiac work up.   14.  Morbid obesity      Quality:  · DVT Prophylaxis: SCD    MEDICATIONS ADMINISTERED IN LAST 1 DA 9/29/2020 0926 New Bag 10 mg Intravenous Eda Truong RN      potassium chloride 40 mEq in sodium chloride 0.9% 250 mL IVPB     Date Action Dose Route User    9/29/2020 0910 New Bag 40 mEq Intravenous Eda Truong RN      [facility]) chewable t

## 2020-09-29 NOTE — PROGRESS NOTES
[facility]  Nephrology Progress Note    Jovita Reina Patient Status:  Inpatient    1960 MRN [id number]   [facility] 8NE-A Attending Michael Tyson MD   Hosp Day # 4 PCP Jazmine Bowens MD       SUBJECTIVE:    No acute issues o tablet, Oral, Q15 Min PRN    •  Pantoprazole Sodium (PROTONIX) 40 mg in Sodium Chloride 0.9 % 10 mL IV push, 40 mg, Intravenous, Q12H          Physical Exam:   /67 (BP Location: Left arm)   Pulse 67   Temp 98 °F (36.7 °C) (Oral)   Resp 22   Ht 68\" he does have longstanding diabetes and hypertension and his wife reports that he was recently referred to nephrology as an outpatient.    Patient is total body fluid overloaded   Cr improving w/ modest UOP  UA is bland w/ urine chem c/w with decreased renal

## 2020-09-29 NOTE — PROGRESS NOTES
[facility]  Progress Note     Sofya Mary Patient Status:  Inpatient    1960 MRN [id number]   [facility] 8NE-A Attending Lai Cedeno MD   Hosp Day # 4 PCP Kaitlin Patten MD     Chief Complaint: edema     S: Patient wit 09/28/20  0637 09/29/20  0553   PTP 43.2* 44.8* 46.3*   INR 4.43* 4.64* 4.84*       Recent Labs   Lab 09/25/20  1154   TROP <0.045            Imaging: Imaging data reviewed in Epic.     Medications:   • simethicone  80 mg Oral TID CC and HS   • furosemide with patient.  Danna Bran MD

## 2020-09-29 NOTE — RESPIRATORY THERAPY NOTE
FELIX - Equipment Use Daily Summary:  · Set Mode   · Usage in hours: 9.1  · 90% Pressure (EPAP) level: 8.5  · 90% Insp Pressure (IPAP):   · AHI: 3.7  · Supplemental Oxygen:  · Comments:

## 2020-09-29 NOTE — PLAN OF CARE
Assumed care for pt at 19:30 on 9/28    A&Ox4  Denies pain  VSS on 2L, spO2 95%  NSR on tele, on diuretics  Cpap HS,   Albumin q 8h, Octreoride gtt infusing  Pt c/o gas, order obtained for simethicone  Ramos draining elena urine  Up with 2 assist and wa

## 2020-09-29 NOTE — PROGRESS NOTES
[facility] Progress Note      Patient Name: Alfredo Soto   YOB: 1960  Medical Record Number: [id number]      Subjective  No bleeding overnight or this morning.      Current Medications   •  simethicone (MYLICON) chewabl 10 mg, Intravenous, Once    •  [COMPLETED] Pantoprazole Sodium (PROTONIX) 40 mg in Sodium Chloride 0.9 % 10 mL IV push, 40 mg, Intravenous, Once    •  [COMPLETED] sodium chloride 0.9% IV bolus 500 mL, 500 mL, Intravenous, Once    •  [COMPLETED] Prothrombin distress. Cardiovascular Regular rate and rhythm. Abdomen Soft. Extremities Bilateral LE edema. Integumentary Skin is warm and dry. Neurologic Motor and sensory grossly intact. Psychiatric Mood and affect appropriate.     Laboratory   Recent Results ( RBC 2.26 (L) 4.30 - 5.70 x10(6)uL    HGB 7.5 (L) 13.0 - 17.5 g/dL    HCT 21.9 (L) 39.0 - 53.0 %    PLT 61.0 (L) 150.0 - 450.0 10(3)uL    MCV 96.9 80.0 - 100.0 fL    MCH 33.2 26.0 - 34.0 pg    MCHC 34.2 31.0 - 37.0 g/dL    RDW 19.3 (H) 11.0 - 15.0 %    R resolved. Blood cultures drawn to rule out infection as cause for DIC. Contacted outside hospital for records x 4 yesterday and still not have received anything. These are necessary to understand the acuity of the findings.  Patient will receive th

## 2020-09-30 ENCOUNTER — ANESTHESIA EVENT (OUTPATIENT)
Dept: ENDOSCOPY | Facility: HOSPITAL | Age: 60
DRG: 377 | End: 2020-09-30
Payer: MEDICARE

## 2020-09-30 ENCOUNTER — ANESTHESIA (OUTPATIENT)
Dept: ENDOSCOPY | Facility: HOSPITAL | Age: 60
DRG: 377 | End: 2020-09-30
Payer: MEDICARE

## 2020-09-30 PROCEDURE — 0DJD8ZZ INSPECTION OF LOWER INTESTINAL TRACT, VIA NATURAL OR ARTIFICIAL OPENING ENDOSCOPIC: ICD-10-PCS | Performed by: STUDENT IN AN ORGANIZED HEALTH CARE EDUCATION/TRAINING PROGRAM

## 2020-09-30 PROCEDURE — 99233 SBSQ HOSP IP/OBS HIGH 50: CPT | Performed by: INTERNAL MEDICINE

## 2020-09-30 PROCEDURE — 0DJ08ZZ INSPECTION OF UPPER INTESTINAL TRACT, VIA NATURAL OR ARTIFICIAL OPENING ENDOSCOPIC: ICD-10-PCS | Performed by: STUDENT IN AN ORGANIZED HEALTH CARE EDUCATION/TRAINING PROGRAM

## 2020-09-30 PROCEDURE — 99233 SBSQ HOSP IP/OBS HIGH 50: CPT | Performed by: SPECIALIST

## 2020-09-30 PROCEDURE — 99233 SBSQ HOSP IP/OBS HIGH 50: CPT | Performed by: HOSPITALIST

## 2020-09-30 RX ORDER — SODIUM CHLORIDE 9 MG/ML
INJECTION, SOLUTION INTRAVENOUS CONTINUOUS PRN
Status: DISCONTINUED | OUTPATIENT
Start: 2020-09-30 | End: 2020-09-30 | Stop reason: SURG

## 2020-09-30 RX ORDER — LIDOCAINE HYDROCHLORIDE 10 MG/ML
INJECTION, SOLUTION EPIDURAL; INFILTRATION; INTRACAUDAL; PERINEURAL AS NEEDED
Status: DISCONTINUED | OUTPATIENT
Start: 2020-09-30 | End: 2020-09-30 | Stop reason: SURG

## 2020-09-30 RX ORDER — MAGNESIUM CARB/ALUMINUM HYDROX 105-160MG
296 TABLET,CHEWABLE ORAL ONCE
Status: COMPLETED | OUTPATIENT
Start: 2020-09-30 | End: 2020-09-30

## 2020-09-30 RX ADMIN — SODIUM CHLORIDE: 9 INJECTION, SOLUTION INTRAVENOUS at 11:46:00

## 2020-09-30 RX ADMIN — SODIUM CHLORIDE: 9 INJECTION, SOLUTION INTRAVENOUS at 12:18:00

## 2020-09-30 RX ADMIN — LIDOCAINE HYDROCHLORIDE 240 MG: 10 INJECTION, SOLUTION EPIDURAL; INFILTRATION; INTRACAUDAL; PERINEURAL at 11:52:00

## 2020-09-30 NOTE — PROGRESS NOTES
[facility] Progress Note      Patient Name: Noel Marquez   YOB: 1960  Medical Record Number: [id number]      Subjective  No bleeding overnight or this morning. Colonoscopy is planned for today.      Current Medications (TYLENOL) tab 650 mg, 650 mg, Oral, Once    •  [COMPLETED] diphenhydrAMINE HCl (BENADRYL) IV PUSH injection 25 mg, 25 mg, Intravenous, Once    •  [COMPLETED] 0.9% NaCl infusion, , Intravenous, Once    •  [COMPLETED] phytonadione (AQUA-MEPHYTON) 10 mg in so 96%   BMI 57.45 kg/m²     Physical Examination   Constitutional NAD. Head Normocephalic and atraumatic. Eyes Conjunctiva clear; mildly icteric sclera. ENMT External nose normal; external ears normal.  Neck No JVD.   Respiratory Normal effort; no respirat remain elevated and fibrinogen remains depressed. Mixing studies consistent with factor deficiency. We obtained labs from [facility] from his admission on 09/11/2020. Of note, labs BEFORE his toe surgery showed an INR of 2.7 and PTT of 145.  Alver Hiller on 09/11/020 was 1.86. Patient's current creatinine is substantially higher than baseline. Nephrology is consulted. Electronically signed by:    Bernadette Terry M.D.   Associate Medical Director of Oncology [facility], [place], [place]

## 2020-09-30 NOTE — PLAN OF CARE
Received care of patient at 0730. Patient A&Ox4. Room air/ 2L NC at [facility]. /FELIX. NSR on tele. VSS. Denies pain. Plans for EGD/ Colonoscopy today. Verbalized understanding. Family at bedside, verbalized understanding. Will continue to monitor.

## 2020-09-30 NOTE — BRIEF OP NOTE
Pre-Operative Diagnosis: Melena [K92.1]  Anemia, unspecified type [D64.9]     Post-Operative Diagnosis: EGD: non-bleeding gastritis COLON: hemorrhoids      Procedure Performed:   Procedure(s):  ESOPHAGOGASTRODUODENOSCOPY (EGD)  COLONOSCOPY    Surgeon(s) an

## 2020-09-30 NOTE — PAYOR COMM NOTE
--------------  CONTINUED STAY REVIEW    Payor: [address] #:  [id number]  Authorization Number: [id number]    Admit date: 9/25/20  Admit time: [id number] 9/30/20 9/30/20   Chief Complaint: edema    S: Stephanie Guzmán bleeding  1. EGD/c-scope today  4. Acute Blood loss anemia on top of AOCD  1. Monitor hgb  2. Transfuse as necessary  5. Hypotension due to acute blood loss anemia/ cirrhosis  1. Midodrine   6. Thrombocytopenia   1. DC ASA  2. Chronic  3.  Likely due to cir Magdi Sexton, RN      Midodrine HCl (PROAMATINE) tab 5 mg     Date Action Dose Route User    9/30/2020 1315 [facility] 5 mg [facility]    9/30/2020 1843 Given 5 mg Oral Mgadi Sexton [facility]    9/29/2020 1611 Given 5 mg Oral Adelita Terrell

## 2020-09-30 NOTE — PROGRESS NOTES
09/30/20 1537   Clinical Encounter Type   Visited With Health care provider  (YUAN Vu Phoned by .   Pt. is under Palliative Care and they should be notified to do the advance directive.)   Continue Visiting No

## 2020-09-30 NOTE — PLAN OF CARE
Problem: GI bleed  Data: Patient alert and oriented overnight, placed on 2L O2 per NC to maintain saturation >90% with sleep. Patient denies pain, up to [facility] with max assist. Patient completed Golytely prep and tolerated well. Small stool output so far.  Ale Age Maintain adequate hydration with IV or PO as ordered and tolerated  - Nasogastric tube to low intermittent suction as ordered  - Evaluate effectiveness of ordered antiemetic medications  - Provide nonpharmacologic comfort measures as appropriate  - Advance

## 2020-09-30 NOTE — ANESTHESIA POSTPROCEDURE EVALUATION
ALEXANDR [address] Patient Status:  Inpatient   Age/Gender 56year old male MRN [id number]   Location [address] [facility]. Attending Ray Case MD   Hosp Day # 5 PCP Ortega Aparicio MD       Anesthesia Post-op Note    Procedure(s)

## 2020-09-30 NOTE — PHYSICAL THERAPY NOTE
PHYSICAL THERAPY TREATMENT NOTE - INPATIENT    Room Number: 8533/1317-R     Session: 1  Number of Visits to Meet Established Goals: 5    Presenting Problem: GI bleed    Problem List  Principal Problem:    Rectal bleeding  Active Problems:    Hyperkalemia standing up from a chair with arms (e.g., wheelchair, bedside commode, etc.): None   -   Moving from lying on back to sitting on the side of the bed?: None   How much help from another person does the patient currently need. ..   -   Moving to and from a be skills.  Recommend HHPT + assist at home vs [facility] placement pending on his progress prior to home d/c     DISCHARGE RECOMMENDATIONS  PT Discharge Recommendations: Home with home health PT;Sub-acute rehabilitation(pending progress)     PLAN  PT Treatment Plan:

## 2020-09-30 NOTE — PROGRESS NOTES
[facility]  Nephrology Progress Note    Shaina Price Patient Status:  Inpatient    1960 MRN [id number]   [facility] 8NE-A Attending Shey Hopkins MD   Hosp Day # 5 PCP Sonya Ibanez MD       SUBJECTIVE:  No acute events 28.0   BUN 43* 46* 42* 39* 31*   CREATSERUM 4.84* 4.60* 3.94* 3.53* 3.14*   CA 9.3 9.5 9.2 8.8 9.7   MG 3.0* 2.7*  --   --  2.2   PHOS 2.2* 2.8 2.9  --   --    * 121* 88 93 99       Recent Labs   Lab 09/26/20  0436 09/27/20  0447 09/28/20  0637 09/2 Intravenous, Q15 Min PRN    Or    •  glucose (DEX4) oral liquid 30 g, 30 g, Oral, Q15 Min PRN    Or    •  Glucose-Vitamin C (DEX-4) chewable tab 8 tablet, 8 tablet, Oral, Q15 Min PRN    •  Pantoprazole Sodium (PROTONIX) 40 mg in Sodium Chloride 0.9 % 10 mL

## 2020-09-30 NOTE — ANESTHESIA PREPROCEDURE EVALUATION
PRE-OP EVALUATION    Patient Name: Deo Wiggins    Pre-op Diagnosis: Melena [K92.1]  Anemia, unspecified type [D64.9]    Procedure(s):  ESOPHAGOGASTRODUODENOSCOPY (EGD)  COLONOSCOPY    Surgeon(s) and Role:     * Scotty Severino MD - Primary    Pre-o Pt's wife Marcela contacted by nurse for ride home    phosphate 20.001 mmol in sodium chloride 0.9% 100 mL IVPB, 20.001 mmol, Intravenous, Once    •  folic acid (FOLVITE) tab 1 mg, 1 mg, Oral, Daily    •  [COMPLETED] 0.9% NaCl infusion, , Intravenous, Once    •  [COMPLETED] acetaminophen (TYLENOL) tab 650 mg, Intravenous, Once        Outpatient Medications:    •  aspirin 81 MG Oral Tab EC, Take 81 mg by mouth daily. , Disp: , Rfl:     •  Albuterol Sulfate  (90 Base) MCG/ACT Inhalation Aero Soln, Inhale 2 puffs into the lungs every 6 (six) hours as needed and CRI  (+) liver disease (H/o liver cancer s/p radiation, now with cirrhosis)                 Cardiovascular  Comment: Echo 9/20/20:  Conclusions:     1. Left ventricle: The cavity size was normal. Wall thickness was mildly     increased.  Systolic functi regular  Rate: normal     Dental  Comment: Edentulous           Pulmonary      Breath sounds clear to auscultation bilaterally. Other findings            ASA: 4   Plan: MAC  NPO status verified and patient meets guidelines.     Post-procedure

## 2020-09-30 NOTE — OPERATIVE REPORT
EGD operative report  Patient Name: Johann Hernandez  Procedure: Esophagogastroduodenoscopy   Indication: anemia  Attending: Dusty Kumar M.D. Consent:  The risks, benefits, and alternatives were discussed with the patient / POA.   Risks included, but wer bleeding.     Sharan Ernst MD

## 2020-09-30 NOTE — PLAN OF CARE
Patient is alert to self, place and time, disoriented to situation and forgetful regarding care plan. Patient up to chair today with one assist, walker and gait belt. Podiatry shoes warn by patient upon ambulating.   Dry dressing applied to right toe, orlin Progressing     Problem: Impaired Functional Mobility  Goal: Achieve highest/safest level of mobility/gait  Description: Interventions:  - Assess patient's functional ability and stability  - Promote increasing activity/tolerance for mobility and gait  - E

## 2020-09-30 NOTE — PROGRESS NOTES
Brief GI Note  Increased risk of bleeding, has been fully reviewed with patient and wife.  The appropriate steps have been taken to mitigate this risk, and the recommendations provided by Hematology have been followed and completed prior to exam.  Of note,

## 2020-09-30 NOTE — PROGRESS NOTES
[facility]  Progress Note     Jovita Reina Patient Status:  Inpatient    1960 MRN [id number]   [facility] 8NE-A Attending Michael Tyson MD   Hosp Day # 5 PCP Jazmine Bowens MD     Chief Complaint: edema     S: Patient fee Labs   Lab 09/27/20  0447 09/28/20  0637 09/29/20  0553   PTP 43.2* 44.8* 46.3*   INR 4.43* 4.64* 4.84*       Recent Labs   Lab 09/25/20  1154   TROP <0.045            Imaging: Imaging data reviewed in Epic.     Medications:   • simethicone  80 mg Oral TID

## 2020-10-01 PROCEDURE — 99233 SBSQ HOSP IP/OBS HIGH 50: CPT | Performed by: INTERNAL MEDICINE

## 2020-10-01 PROCEDURE — 99233 SBSQ HOSP IP/OBS HIGH 50: CPT | Performed by: HOSPITALIST

## 2020-10-01 PROCEDURE — B547ZZA ULTRASONOGRAPHY OF LEFT SUBCLAVIAN VEIN, GUIDANCE: ICD-10-PCS | Performed by: INTERNAL MEDICINE

## 2020-10-01 PROCEDURE — 05H633Z INSERTION OF INFUSION DEVICE INTO LEFT SUBCLAVIAN VEIN, PERCUTANEOUS APPROACH: ICD-10-PCS | Performed by: INTERNAL MEDICINE

## 2020-10-01 PROCEDURE — 99233 SBSQ HOSP IP/OBS HIGH 50: CPT | Performed by: SPECIALIST

## 2020-10-01 PROCEDURE — 99231 SBSQ HOSP IP/OBS SF/LOW 25: CPT | Performed by: NURSE PRACTITIONER

## 2020-10-01 RX ORDER — POTASSIUM CHLORIDE 20 MEQ/1
40 TABLET, EXTENDED RELEASE ORAL ONCE
Status: COMPLETED | OUTPATIENT
Start: 2020-10-01 | End: 2020-10-01

## 2020-10-01 RX ORDER — ACETAMINOPHEN 325 MG/1
650 TABLET ORAL ONCE
Status: DISCONTINUED | OUTPATIENT
Start: 2020-10-01 | End: 2020-10-07

## 2020-10-01 RX ORDER — HYDROXYZINE HYDROCHLORIDE 10 MG/1
10 TABLET, FILM COATED ORAL 2 TIMES DAILY PRN
Status: DISCONTINUED | OUTPATIENT
Start: 2020-10-01 | End: 2020-10-07

## 2020-10-01 RX ORDER — SODIUM CHLORIDE 9 MG/ML
INJECTION, SOLUTION INTRAVENOUS ONCE
Status: COMPLETED | OUTPATIENT
Start: 2020-10-01 | End: 2020-10-01

## 2020-10-01 RX ORDER — HYDROXYZINE HYDROCHLORIDE 25 MG/1
25 TABLET, FILM COATED ORAL 3 TIMES DAILY PRN
Status: CANCELLED | OUTPATIENT
Start: 2020-10-01

## 2020-10-01 NOTE — PLAN OF CARE
Assumed care at 0730 this AM. 1 unit PRBCs ordered per Dr. Deborah Mckeon. PICC team at bedside, inserted Extended PIV in right arm. Called PICC team to bedside and adjusted catheter x2 d/t not flushing.  PICC team removed catheter and inserted new Midline in left Problem: CARDIOVASCULAR - ADULT  Goal: Maintains optimal cardiac output and hemodynamic stability  Description: INTERVENTIONS:  - Monitor vital signs, rhythm, and trends  - Monitor for bleeding, hypotension and signs of decreased cardiac output  - Evalua Progressing     Problem: MUSCULOSKELETAL - ADULT  Goal: Return mobility to safest level of function  Description: INTERVENTIONS:  - Assess patient stability and activity tolerance for standing, transferring and ambulating w/ or w/o assistive devices  - Ass

## 2020-10-01 NOTE — PAYOR COMM NOTE
--------------  CONTINUED STAY REVIEW    Payor: [address] #:  [id number]  Authorization Number: [id number]    Admit date: 9/25/20  Admit time: ARTUR English 505 10/1/20    10/1/20    Chief Complaint: edema   S: Sha Brace better  3. GI bleeding/rectal bleeding  1. EGD/c-scope with int hemorrhoids. 2. No further bleeding noted  4. Acute Blood loss anemia on top of AOCD  1. Monitor hgb  2. Transfuse as necessary  5. Hypotension due to acute blood loss anemia/ cirrhosis  1. Aspart Pen (NOVOLOG) 100 UNIT/ML flexpen 1-5 Units     Date Action Dose Route User    10/1/2020 1208 Given 3 Units Subcutaneous (Left Upper Arm) Yvette Ventura RN    9/30/2020 1829 Given 3 Units Subcutaneous (Left Upper Arm) Michelle Borrego, RN

## 2020-10-01 NOTE — PROGRESS NOTES
[address] [address] Follow Up    Megan Rojo Patient Status:  Inpatient    1960 MRN [id number]   [facility] 8NE-A Attending Tulio Parra MD   Hosp Day # 6 PCP Najma Clark MD     Date of visit:  10/1/2020  Day 6 HABITUS:  obese  CARDIAC: HR 80s per tele. RESPIRATORY: No increased effort on current supplemental per NC.  EXTREMITIES:  ++ BLE pedal edema. DP pulses palpable. R great toe - well-approximated incision site + sutures intact.   NEUROLOGIC: Alert and arlene circumstances. He reviewed the support his S.O. Martha Johnson provides in his overall wellbeing, companionship, and her support in collecting and sorting through medical info and helping him making decisions.     He stated Martha Johnson has not produced the [facility] document coagulation) ([facility] [address]     Chronic kidney disease     Palliative care encounter     Counseling regarding advance care planning and goals of care     Anasarca     Liver disease     Liver mass, left lobe     Chronic renal failure, stage 3 (moderate)     Acute re

## 2020-10-01 NOTE — PROGRESS NOTES
Patient continues on lasix gtt at 10cc/hr. Diuresing well via tabor. Patient very edematous. Receiving scheduled albumin as well. Patient with accuchecks QID. Denies pain . Plan of care updated with patient.

## 2020-10-01 NOTE — DIETARY NOTE
[facility]     Admitting diagnosis:  Rectal bleeding [K62.5]  Thrombocytopenia ([facility] [address] [D69.6]  Hypotension, unspecified hypotension type [I95.9]  Anemia, unspecified type [D64.9]  Renal failure, unspecified chron

## 2020-10-01 NOTE — CM/SW NOTE
STUART spoke to wife today re: eventual dc planning. Wife is hoping that pt will do well enough to return home with [facility]. Last PT recs were ( Home with [facility] vs CASTRO depending on progress). Wife states if CASTRO is needed, they would chose [facility] [address]

## 2020-10-01 NOTE — OPERATIVE REPORT
Colon operative report  Patient Name: Nadya Ding  Procedure: Colonoscopy   Indication: melena, anemia  Attending: [place] Blanca M.D. Consent: The risks, benefits, and alternatives were discussed with the patient / POA.   Risks included, but were not years  2.   See EGD report from same date for further details    Nasir Marc MD

## 2020-10-01 NOTE — PROGRESS NOTES
[facility] received palliative referral. Waiting to obtain insurance (verification/authorization) prior to pursuing.          [facility]  Residential Palliative Liaison  [phone]

## 2020-10-01 NOTE — PLAN OF CARE
Problem: Diabetes/Glucose Control  Goal: Glucose maintained within prescribed range  Description: INTERVENTIONS:  - Monitor Blood Glucose as ordered  - Assess for signs and symptoms of hyperglycemia and hypoglycemia  - Administer ordered medications to m

## 2020-10-01 NOTE — VASCULAR ACCESS
Called to room to assess right upper arm extended length catheter. Unable to obtain blood return and unable to flush. Dressing removed and still unable to flush or obtain blood return. Catheter removed intact with small kink noted in catheter.   Left arm

## 2020-10-01 NOTE — PROGRESS NOTES
[facility]  Progress Note     Diya Vivas Patient Status:  Inpatient    1960 MRN [id number]   [facility] 8NE-A Attending Jose Castillo MD   Hosp Day # 6 PCP Lilibeth Romero MD     Chief Complaint: edema     S: Patient has Clearance: 24.6 mL/min (A) (based on SCr of 3.01 mg/dL (H)).     Recent Labs   Lab 09/28/20  0637 09/29/20  0553 10/01/20  0546   PTP 44.8* 46.3* 38.7*   INR 4.64* 4.84* 3.85*       Recent Labs   Lab 09/25/20  1154   TROP <0.045            Imaging: Imaging date of discharge: TBD  Discharge is dependent on: Course  At this point Mr. Shanta Gordon is expected to be discharge to: TBD     Plan of care discussed with patient.  Leodan Will MD

## 2020-10-01 NOTE — PROGRESS NOTES
[facility]      Patient Name: Sam Alexander   YOB: 1960  Medical Record Number: [id number]      Subjective  No bleeding overnight or this morning.      Current Medications   •  0.9% NaCl infusion, , Krystle Bess (FOLVITE) tab 1 mg, 1 mg, Oral, Daily    •  [COMPLETED] 0.9% NaCl infusion, , Intravenous, Once    •  [COMPLETED] acetaminophen (TYLENOL) tab 650 mg, 650 mg, Oral, Once    •  [COMPLETED] diphenhydrAMINE HCl (BENADRYL) IV PUSH injection 25 mg, 25 mg, Patricia Schwartz arm)   Pulse 78   Temp 99.3 °F (37.4 °C) (Oral)   Resp 22   Ht 1.727 m (5' 8\")   Wt (!) 172.4 kg (380 lb 1.6 oz)   SpO2 93%   BMI 57.79 kg/m²     Physical Examination   Constitutional NAD. Head Normocephalic and atraumatic.   Eyes Conjunctiva clear; mildl (H) 70 - 99 mg/dL    Sodium 143 136 - 145 mmol/L    Potassium 3.3 (L) 3.5 - 5.1 mmol/L    Chloride 109 98 - 112 mmol/L    CO2 30.0 21.0 - 32.0 mmol/L    Anion Gap 4 0 - 18 mmol/L    BUN 29 (H) 7 - 18 mg/dL    Creatinine 3.01 (H) 0.70 - 1.30 mg/dL    BUN/CR gallbladder, common bile duct, pancreas, spleen, kidneys, IVC, and aorta. PATIENT STATED HISTORY: (As transcribed by Technologist)  Patient offered no additional history at this time.           FINDINGS:    LIVER:  As noted on recent CT scan, there is s dysfunction. Presence of DIC cannot be excluded as it may have been present on 09/11/2020. There is no current evidence of infection - blood cultures are negative.  Records from [facility] showed superficial wound infection for which he received antibio Electronically signed by:    Radha Castillo M.D.   Associate Medical Director of Oncology [facility], [place], [place]

## 2020-10-01 NOTE — CM/SW NOTE
[facility] ([phone] can accept if pt is dc'd to home. [facility] can accept for CASTRO. New order placed for community palliative case. Domingo Leary from [facility] will see if they can follow at dc.     Josh Campos, LCSW  /Discharge P

## 2020-10-01 NOTE — PROGRESS NOTES
[facility]  Nephrology Progress Note    Joaquin Mcconnell Patient Status:  Inpatient    1960 MRN [id number]   [facility] 8NE-A Attending Marilou Casanova MD   Hosp Day # 6 PCP Ortega Aparicio MD       SUBJECTIVE:  Stable this AM 10/01/20  0546    144 142 142 144 143   K 5.6* 3.9 4.3 3.6 3.5 3.3*   * 112 110 109 109 109   CO2 24.0 29.0 28.0 31.0 28.0 30.0   BUN 43* 46* 42* 39* 31* 29*   CREATSERUM 4.84* 4.60* 3.94* 3.53* 3.14* 3.01*   CA 9.3 9.5 9.2 8.8 9.7 9.0   MG 3.0 Or    •  morphINE sulfate (PF) 2 MG/ML injection 2 mg, 2 mg, Intravenous, Q2H PRN    Or    •  morphINE sulfate (PF) 4 MG/ML injection 4 mg, 4 mg, Intravenous, Q2H PRN    •  glucose (DEX4) oral liquid 15 g, 15 g, Oral, Q15 Min PRN    Or    •  Glucose-Vitami

## 2020-10-02 PROCEDURE — 99233 SBSQ HOSP IP/OBS HIGH 50: CPT | Performed by: INTERNAL MEDICINE

## 2020-10-02 PROCEDURE — 99233 SBSQ HOSP IP/OBS HIGH 50: CPT | Performed by: HOSPITALIST

## 2020-10-02 RX ORDER — POTASSIUM CHLORIDE 20 MEQ/1
40 TABLET, EXTENDED RELEASE ORAL ONCE
Status: COMPLETED | OUTPATIENT
Start: 2020-10-02 | End: 2020-10-02

## 2020-10-02 NOTE — PHYSICAL THERAPY NOTE
PHYSICAL THERAPY TREATMENT NOTE - INPATIENT    Room Number: 6427/2799-G     Session: 2  Number of Visits to Meet Established Goals: 5    Presenting Problem: GI bleed    Problem List  Principal Problem:    Rectal bleeding  Active Problems:    Hyperkalemia TOLERANCE: Fair        O2 WALK      AM-PAC '6-Clicks' INPATIENT SHORT FORM - BASIC MOBILITY  How much difficulty does the patient currently have. ..  -   Turning over in bed (including adjusting bedclothes, sheets and blankets)?: None   -   Sitting down on tolerance and function. Pt needed 2 L o2 due to desaturation during mobility. Without supplement o2, pt at 86% and saturation above 90% on 2 L.  to 143 at times during session.   Pt will benefit from ongoing PT to continue to improve strength, enduran

## 2020-10-02 NOTE — PAYOR COMM NOTE
--------------  CONTINUED STAY REVIEW    Payor: [facility]  Subscriber #:  [id number]  Authorization Number: [id number]    Admit date: 9/25/20  Admit time: ARTUR English 505 10/2/20    10/2/20  Chief Complaint: edema      Vital bleeding/rectal bleeding  1. EGD/c-scope with int hemorrhoids. 2. No further bleeding noted  4. Acute Blood loss anemia on top of AOCD  1. Monitor hgb  2. Transfuse as necessary  5. Hypotension due to acute blood loss anemia/ cirrhosis  1. Midodrine   6. 10 mg Oral Annie Gregory RN      Insulin Aspart Pen (NOVOLOG) 100 UNIT/ML flexpen 1-5 Units     Date Action Dose Route User    10/1/2020 6907 Given 1 Units Subcutaneous (Left Upper Arm) Annie Gregory RN      Midodrine HCl (PROAMATINE) tab 5 mg

## 2020-10-02 NOTE — PLAN OF CARE
Assumed care of patient at 0700. NSR/Sinus Tach/PVC's. VSS. No c/o SOB, chest pain, or pain. On 2L NC maintaining sats >95%. Significant generalized edema. 3+ edema in BLE. Continent of bowel, tabor catheter in place for strict I&Os. Urine output good. in ADLs to maximize function  - Promote sitting position while performing ADLs such as feeding, grooming, and bathing  - Educate and encourage patient/family in tolerated functional activity level and precautions during self-care  - Encourage patient to in baseline bowel function  Description: INTERVENTIONS:  - Assess bowel function  - Maintain adequate hydration with IV or PO as ordered and tolerated  - Evaluate effectiveness of GI medications  - Encourage mobilization and activity  - Obtain nutritional con

## 2020-10-02 NOTE — PROGRESS NOTES
[facility]  Progress Note     Digna Brand Patient Status:  Inpatient    1960 MRN [id number]   [facility] 8NE-A Attending eDon Clay MD   Hosp Day # 7 PCP Isak Everett MD     Chief Complaint: edema     S: Patient fee 27.3 mL/min (A) (based on SCr of 2.71 mg/dL (H)).     Recent Labs   Lab 09/28/20  0637 09/29/20  0553 10/01/20  0546   PTP 44.8* 46.3* 38.7*   INR 4.64* 4.84* 3.85*       Recent Labs   Lab 09/25/20  1154   TROP <0.045            Imaging: Imaging data review this point Mr. Alexis Mejia is expected to be discharge to: TBD     Plan of care discussed with patient.  Luis Andrew MD

## 2020-10-02 NOTE — PROGRESS NOTES
[facility]  Nephrology Progress Note    John Sharma Patient Status:  Inpatient    1960 MRN [id number]   [facility] 8NE-A Attending Rick Harrison MD   Hosp Day # 7 PCP Jeyson Chan MD       SUBJECTIVE:  Stable today, cont displayed. Recent Labs   Lab 09/26/20  0436 09/27/20  0447 09/28/20  0637 09/29/20  0553 09/30/20  0748 10/01/20  0546 10/02/20  0629    144 142 142 144 143 141   K 5.6* 3.9 4.3 3.6 3.5 3.3* 3.4*   * 112 110 109 109 109 103   CO2 24.0 29. mg, 2 mg, Intravenous, Q2H PRN    Or    •  morphINE sulfate (PF) 4 MG/ML injection 4 mg, 4 mg, Intravenous, Q2H PRN    •  glucose (DEX4) oral liquid 15 g, 15 g, Oral, Q15 Min PRN    Or    •  Glucose-Vitamin C (DEX-4) chewable tab 4 tablet, 4 tablet, Oral,

## 2020-10-02 NOTE — PLAN OF CARE
Assumed care of pt at 0000 a/o x4 and sleepy. Monitor shows sinus rhythm and stable BP. On 2L per NC as he cannot tolerate CPAP. Ramos is c/d/i and with statlock in place draining clear yellow urine.   He is on a lasix gtt at 20mg/hr to midline in [facility] an

## 2020-10-02 NOTE — CM/SW NOTE
Referral sent to [facility] via [facility]- await acceptance.     Radha Alston LCSW  /Discharge Planner  [phone]

## 2020-10-03 PROCEDURE — 99233 SBSQ HOSP IP/OBS HIGH 50: CPT | Performed by: INTERNAL MEDICINE

## 2020-10-03 PROCEDURE — 99232 SBSQ HOSP IP/OBS MODERATE 35: CPT | Performed by: HOSPITALIST

## 2020-10-03 RX ORDER — POTASSIUM CHLORIDE 20 MEQ/1
40 TABLET, EXTENDED RELEASE ORAL ONCE
Status: COMPLETED | OUTPATIENT
Start: 2020-10-03 | End: 2020-10-03

## 2020-10-03 NOTE — PLAN OF CARE
Pt has no complaints of pain, malaise, or cardiac symptoms. Pt is A&Ox4, no neuro symptoms present. Lungs are diminished on auscultation. Pt on 2 L O2 NC. NSR on monitor with PACs. +3 generalized edema and BLE edema. Nonpitting BUE edema.  Pt is continent o precautions  -Ambulate 3+ times per day in hallway with assist  Outcome: Not Progressing     Problem: Impaired Activities of Daily Living  Goal: Achieve highest/safest level of independence in self care  Description: Interventions:  - Assess ability and en nonpharmacologic comfort measures as appropriate  - Advance diet as tolerated, if ordered  - Obtain nutritional consult as needed  - Evaluate fluid balance  Outcome: Not Progressing  Goal: Maintains or returns to baseline bowel function  Description: INTER

## 2020-10-03 NOTE — PROGRESS NOTES
[facility]  Nephrology Progress Note    Edwena Redo Patient Status:  Inpatient    1960 MRN [id number]   [facility] 8NE-A Attending Chelsi Robbins MD   Hosp Day # 8 PCP Richy Munguia MD       SUBJECTIVE:  UOP excellent and e 4.64* 4.84*  --  3.85*  --     < > = values in this interval not displayed. Recent Labs   Lab 09/27/20  0447 09/28/20  9339 09/29/20  0553 09/30/20  0748 10/01/20  0546 10/02/20  0629 10/03/20  0553    142 142 144 143 141 141   K 3.9 4.3 3.6 3. PRN    Or    •  morphINE sulfate (PF) 2 MG/ML injection 2 mg, 2 mg, Intravenous, Q2H PRN    Or    •  morphINE sulfate (PF) 4 MG/ML injection 4 mg, 4 mg, Intravenous, Q2H PRN    •  glucose (DEX4) oral liquid 15 g, 15 g, Oral, Q15 Min PRN    Or    •  Glucose

## 2020-10-03 NOTE — PROGRESS NOTES
[facility]  Progress Note     Diya Ortega Patient Status:  Inpatient    1960 MRN [id number]   [facility] 8NE-A Attending Jose Castillo MD   Hosp Day # 8 PCP Lilibeth Romero MD     Chief Complaint: edema     S: Patient con 5. 6* 6.0* 5.8*       Estimated Creatinine Clearance: 27.1 mL/min (A) (based on SCr of 2.73 mg/dL (H)).     Recent Labs   Lab 09/28/20  0637 09/29/20  0553 10/01/20  0546   PTP 44.8* 46.3* 38.7*   INR 4.64* 4.84* 3.85*       No results for input(s): TROP, CK point Mr. Catherine Platt is expected to be discharge to: TBD     Plan of care discussed with patient.  Jared Sheth MD

## 2020-10-04 PROCEDURE — 99233 SBSQ HOSP IP/OBS HIGH 50: CPT | Performed by: INTERNAL MEDICINE

## 2020-10-04 PROCEDURE — 99232 SBSQ HOSP IP/OBS MODERATE 35: CPT | Performed by: HOSPITALIST

## 2020-10-04 RX ORDER — POTASSIUM CHLORIDE 20 MEQ/1
40 TABLET, EXTENDED RELEASE ORAL ONCE
Status: COMPLETED | OUTPATIENT
Start: 2020-10-04 | End: 2020-10-04

## 2020-10-04 NOTE — PLAN OF CARE
Assumed care of patient at [facility]. Monitor tele,  on NC. Lasix gtt infusing. Ramos in place for output. IV albumin. Monitor I&O. Fall precautions in place.  Labs in am. Will continue to monitor

## 2020-10-04 NOTE — PROGRESS NOTES
[facility]  Progress Note     Marleen Ruiz Patient Status:  Inpatient    1960 MRN [id number]   [facility] 8NE-A Attending Alyx Moore MD   Hosp Day # 9 PCP Dulce Monteiro MD     Chief Complaint: edema     S: Patient fee hours.         Imaging: Imaging data reviewed in Epic.     Medications:   • potassium chloride  40 mEq Intravenous Once   • acetaminophen  650 mg Oral Once   • simethicone  80 mg Oral TID CC and HS   • Insulin Aspart Pen  1-5 Units Subcutaneous TID CC and H

## 2020-10-04 NOTE — PROGRESS NOTES
[facility]  Nephrology Progress Note    Rupert Galindo Patient Status:  Inpatient    1960 MRN [id number]   [facility] 8NE-A Attending Baudilio Kwon MD   Hosp Day # 9 PCP Fanny Hunt MD       SUBJECTIVE:  Has been stable, no 10/03/20  0553 10/04/20  0532      < > 144 143 141 141 141   K 4.3   < > 3.5 3.3* 3.4* 2.9* 2.9*      < > 109 109 103 100 97*   CO2 28.0   < > 28.0 30.0 34.0* 38.0* 43.0*   BUN 42*   < > 31* 29* 24* 22* 20*   CREATSERUM 3.94*   < > 3.14* 3.01* (DEX4) oral liquid 15 g, 15 g, Oral, Q15 Min PRN    Or    •  Glucose-Vitamin C (DEX-4) chewable tab 4 tablet, 4 tablet, Oral, Q15 Min PRN    Or    •  dextrose 50 % injection 50 mL, 50 mL, Intravenous, Q15 Min PRN    Or    •  glucose (DEX4) oral liquid 30 g

## 2020-10-04 NOTE — PLAN OF CARE
Pt has no complaints of pain, malaise, or cardiac symptoms. Pt is A&Ox4, no other neuro symptoms present. Lungs are clear and diminished on auscultation. On 1-2 L O2. Non-productive cough. NSR with PACs on monitor. Pulses 1/1. +3 generalized and BLE edema. activity level and precautions  -Ambulate 3+ times per day in hallway with assist  Outcome: Progressing     Problem: Impaired Activities of Daily Living  Goal: Achieve highest/safest level of independence in self care  Description: Interventions:  - Assess nonpharmacologic comfort measures as appropriate  - Advance diet as tolerated, if ordered  - Obtain nutritional consult as needed  - Evaluate fluid balance  Outcome: Progressing  Goal: Maintains or returns to baseline bowel function  Description: INTERVENT nutrition consult as needed  - Instruct patient on self management of diabetes  Outcome: Progressing     Problem: Patient/Family Goals  Goal: Patient/Family Long Term Goal  Description: Patient's Long Term Goal: Discharge to [facility]    Interventions:  - comply cardiac output and hemodynamic stability  Description: INTERVENTIONS:  - Monitor vital signs, rhythm, and trends  - Monitor for bleeding, hypotension and signs of decreased cardiac output  - Evaluate effectiveness of vasoactive medications to optimize hemo mobility to safest level of function  Description: INTERVENTIONS:  - Assess patient stability and activity tolerance for standing, transferring and ambulating w/ or w/o assistive devices  - Assist with transfers and ambulation using safe patient handling e

## 2020-10-05 ENCOUNTER — TELEPHONE (OUTPATIENT)
Dept: PALLIATIVE CARE | Facility: CLINIC | Age: 60
End: 2020-10-05

## 2020-10-05 PROCEDURE — 99233 SBSQ HOSP IP/OBS HIGH 50: CPT | Performed by: INTERNAL MEDICINE

## 2020-10-05 PROCEDURE — 99232 SBSQ HOSP IP/OBS MODERATE 35: CPT | Performed by: HOSPITALIST

## 2020-10-05 RX ORDER — HYDROXYZINE HYDROCHLORIDE 10 MG/1
10 TABLET, FILM COATED ORAL 2 TIMES DAILY PRN
Qty: 30 TABLET | Refills: 0 | Status: SHIPPED | OUTPATIENT
Start: 2020-10-05

## 2020-10-05 RX ORDER — FUROSEMIDE 40 MG/1
80 TABLET ORAL DAILY
Status: DISCONTINUED | OUTPATIENT
Start: 2020-10-05 | End: 2020-10-07

## 2020-10-05 RX ORDER — POTASSIUM CHLORIDE 20 MEQ/1
40 TABLET, EXTENDED RELEASE ORAL ONCE
Status: COMPLETED | OUTPATIENT
Start: 2020-10-05 | End: 2020-10-05

## 2020-10-05 RX ORDER — POTASSIUM CHLORIDE 20 MEQ/1
40 TABLET, EXTENDED RELEASE ORAL EVERY 4 HOURS
Status: COMPLETED | OUTPATIENT
Start: 2020-10-05 | End: 2020-10-05

## 2020-10-05 NOTE — PHYSICAL THERAPY NOTE
PHYSICAL THERAPY TREATMENT NOTE - INPATIENT    Room Number: 3005/7129-G     Session: 3  Number of Visits to Meet Established Goals: 5    Presenting Problem: GI bleed    Problem List  Principal Problem:    Rectal bleeding  Active Problems:    Hyperkalemia SHORT FORM - BASIC MOBILITY  How much difficulty does the patient currently have. ..  -   Turning over in bed (including adjusting bedclothes, sheets and blankets)?: None   -   Sitting down on and standing up from a chair with arms (e.g., wheelchair, bedsid therapeutic level on room air. Continues to have HR in 120s with minimal exertion. Pt will benefit from ongoing PT to continue to improve strength, endurance, balance, and gait in order to facilitate to his highest  functional skills. Recommend GONZÁLEZ Hoskins

## 2020-10-05 NOTE — CM/SW NOTE
SW spoke to pt's wife, Stephanie Gongora, over the phone about discharge plans. Stephanie Gongora stated that they are leaning towards discharging the pt home instead of CASTRO. STUART received call earlier this morning from [place] at [facility].  He was requesting some updat

## 2020-10-05 NOTE — PROGRESS NOTES
Problem: Diabetes/Glucose Control  Goal: Glucose maintained within prescribed range  Description: INTERVENTIONS:  - Monitor Blood Glucose as ordered  - Assess for signs and symptoms of hyperglycemia and hypoglycemia  - Administer ordered medications to [facility] meals.  Medicate prn for pain. Will continue POC.

## 2020-10-05 NOTE — PROGRESS NOTES
[facility]  Progress Note     Yoana Lab Patient Status:  Inpatient    1960 MRN [id number]   [facility] 8NE-A Attending Eddie Vaughn MD   Hosp Day # 10 PCP Hosea Strange MD     Chief Complaint: edema     S: Patient is mg Oral Once   • simethicone  80 mg Oral TID CC and HS   • Insulin Aspart Pen  1-5 Units Subcutaneous TID CC and HS   • Midodrine HCl  5 mg Oral TID   • folic acid  1 mg Oral Daily       ASSESSMENT / PLAN:     1. Anasarca/fluid OL  1. Due to cirrhosis  2.

## 2020-10-05 NOTE — TELEPHONE ENCOUNTER
Received voicemail from SW at [facility] with request to speak to palliative or SW for this pt. I returned call at [phone] today, and left voicemail with my contact information as well as contact information for SW covering Mr. Jed Alas today.     Subsequently

## 2020-10-05 NOTE — PAYOR COMM NOTE
--------------  CONTINUED STAY REVIEW    Payor: [address] [place] [address] #:  [id number]  Authorization Number: [id number]    Admit date: 9/25/20  Admit time: [facility] 10/3/20-10/4/20    10/3/20   Chief Complaint: edema AOCD  1. Monitor hgb  2. Transfuse as necessary  5. Hypotension due to acute blood loss anemia/ cirrhosis  1. Midodrine   6. Thrombocytopenia   1. DC ASA  2. Chronic  3. Likely due to cirrhosis and possibly DIC  7. Coagulopathy  1.  Given [employer] CREATSERUM 3.94*   < > 3.14* 3.01* 2.71* 2.73* 2.46*   CA 9.2   < > 9.7 9.0 9.6 9.3 9.2   MG  --   --  2.2  --   --   --   --    PHOS 2.9  --   --   --   --   --   --    GLU 88   < > 99 133* 87 100* 98     Lab 09/29/20  0553 09/30/20  0748 10/01/20  0546 Date Action Dose Route User    10/4/2020 1744 Given 1 Units Subcutaneous (Right Upper Arm) Paty Schmitz, RN      Midodrine HCl (PROAMATINE) tab 5 mg     Date Action Dose Route User    10/5/2020 1111 Given 5 mg Oral Cheryl Oconnell RN    10/5/2020

## 2020-10-05 NOTE — PLAN OF CARE
Tele monitoring. I/o. Daily weight. Ramos catheter care. 1500 cc fluid restriction. Possible discharge soon. Po diuretics. Home 02.    Problem: Diabetes/Glucose Control  Goal: Glucose maintained within prescribed range  Description: INTERVENTIONS:  - Monito

## 2020-10-05 NOTE — CONSULTS
[facility]  Report of Inpatient Wound Care Consultation     Etheldavian Jiménez Patient Status:  Inpatient    1960 MRN [id number]   [facility] 8NE-A Attending Kirit Bruno MD   Hosp Day # 10 PCP Andrew Reynoso MD     REASON FOR 9.1  --    ALB 2.6*  --  3.1*  --  2.9*  --  3.5  --  3.4  --  3.7  --   --   --  3.4  --    TP 5.3*  --  5.4*  --  5.6*  --  6.0*  --  5.8*  --  5.9*  --   --   --  5.7*  --    .1*  --   --   --  81.8*  --   --   --   --   --   --   --   --   -- RN.      Thank you for this consultation and for allowing me to participate in the care of your patient. Please call me at [phone] if you have any questions about this consultation and plan of care.   If unable to reach me at this number, please call the Inp

## 2020-10-05 NOTE — PLAN OF CARE
Problem: Impaired Activities of Daily Living  Goal: Achieve highest/safest level of independence in self care  Description: Interventions:  - Assess ability and encourage patient to participate in ADLs to maximize function  - Promote sitting position [facility]

## 2020-10-05 NOTE — CM/SW NOTE
Tabitha authorization initiated through web portal. Pending Case ID: [id number]  Informed them of pt discharge today. Dima Millan from the [facility] aware.  &  to remain available and supportive for discharge planning needs.

## 2020-10-05 NOTE — PROGRESS NOTES
[facility]  Nephrology Progress Note    Jovita Reina Patient Status:  Inpatient    1960 MRN [id number]   [facility] 8NE-A Attending Michael Tyson MD   Hosp Day # 10 PCP Jazmine Bowens MD       SUBJECTIVE:  No acute events no 141 141 141 143   K 3.5 3.3* 3.4* 2.9* 2.9* 3.1*    109 103 100 97* 100   CO2 28.0 30.0 34.0* 38.0* 43.0* 39.0*   BUN 31* 29* 24* 22* 20* 19*   CREATSERUM 3.14* 3.01* 2.71* 2.73* 2.46* 2.31*   CA 9.7 9.0 9.6 9.3 9.2 9.1   MG 2.2  --   --   --   -- Min PRN    Or    •  Glucose-Vitamin C (DEX-4) chewable tab 8 tablet, 8 tablet, Oral, Q15 Min PRN          Impression/Plan:    #1.   RASHAD/CKD-Baseline creatinine is unknown although he does have longstanding diabetes and hypertension and his wife reports that

## 2020-10-06 PROCEDURE — 99232 SBSQ HOSP IP/OBS MODERATE 35: CPT | Performed by: HOSPITALIST

## 2020-10-06 PROCEDURE — 99233 SBSQ HOSP IP/OBS HIGH 50: CPT | Performed by: INTERNAL MEDICINE

## 2020-10-06 RX ORDER — LACTULOSE 10 G/15ML
20 SOLUTION ORAL 3 TIMES DAILY
Status: DISCONTINUED | OUTPATIENT
Start: 2020-10-06 | End: 2020-10-06

## 2020-10-06 RX ORDER — POTASSIUM CHLORIDE 20 MEQ/1
40 TABLET, EXTENDED RELEASE ORAL ONCE
Status: COMPLETED | OUTPATIENT
Start: 2020-10-06 | End: 2020-10-06

## 2020-10-06 RX ORDER — POTASSIUM CHLORIDE 20 MEQ/1
40 TABLET, EXTENDED RELEASE ORAL ONCE
Status: DISCONTINUED | OUTPATIENT
Start: 2020-10-06 | End: 2020-10-07

## 2020-10-06 NOTE — CM/SW NOTE
Received response from [facility] that request for CASTRO stay @ the [facility] [employer] has been denied. Page out to dr Mary Ellen Moore to update.    Peer to peer discussion for case review within 24-hours [phone]  Member # [id number]  Case #

## 2020-10-06 NOTE — PLAN OF CARE
Received patient, alert and oriented. Denied pain, denied SOB at rest. On O2 2L/NC. Discussed POC. Due meds given. Safety measures reinforced, call light within reach. Needs attended to. Will continue to monitor.   0645: Received call from lab of patient's feeding, grooming, and bathing  - Educate and encourage patient/family in tolerated functional activity level and precautions during self-care    Outcome: Progressing     Problem: Impaired Activities of Daily Living  Goal: Achieve highest/safest level of i Maintain adequate hydration with IV or PO as ordered and tolerated  - Evaluate effectiveness of GI medications  - Encourage mobilization and activity  - Obtain nutritional consult as needed  - Establish a toileting routine/schedule  - Consider collaboratin

## 2020-10-06 NOTE — PLAN OF CARE
Alert & oriented x4. NSR on tele. Denies chest pain & SOB. O2 sats WNL on 1L. Pt in on room air at baseline. Will continue to attempt to wean & encourage IS. Bipap at noc. K+ 3.5 & replaced. Continent of bowel & bladder. Up x1 assist w/ walker.  Right big t level of independence in self care  Description: Interventions:  - Assess ability and encourage patient to participate in ADLs to maximize function  - Promote sitting position while performing ADLs such as feeding, grooming, and bathing  - Educate and enco ordered  - Obtain nutritional consult as needed  - Evaluate fluid balance  Outcome: Progressing  Goal: Maintains or returns to baseline bowel function  Description: INTERVENTIONS:  - Assess bowel function  - Maintain adequate hydration with IV or PO as ord

## 2020-10-06 NOTE — OCCUPATIONAL THERAPY NOTE
OCCUPATIONAL THERAPY TREATMENT NOTE - INPATIENT     Room Number: 7826/4127-N  Session:   Number of Visits to Meet Established Goals: 5    Presenting Problem: GI bleed, recent R great toe amputation 9/11/20    History related to current admission:   Alexa Soto compression garments for BLE, however does not wear d/t discomfort.     Patient self-stated goal is to go home    OBJECTIVE  Precautions: Bed/chair alarm    WEIGHT BEARING RESTRICTION                   PAIN ASSESSMENT              ACTIVITY TOLERANCE donning/doffing technique, wearing schedule and importance of maintaining double layer at foot and not at knee. Patient verbalized understanding. Discussed need for alternative permanent compression stockings once edema is reduced.   Patient was given strengthening/ROM; Patient/Family education;Patient/Family training;Equipment eval/education; Compensatory technique education  Rehab Potential : Good  Frequency (Obs): 3-5x/week    ADL Goals   Patient will perform grooming: with setup  Patient will perform

## 2020-10-06 NOTE — PROGRESS NOTES
[facility] HOSPITAL[facility]  Progress Note     Reji Adelr Patient Status:  Inpatient    1960 MRN [id number]   [facility] 8NE-A Attending Waldo Person MD   Hosp Day # 6 PCP More Briceno MD     Chief Complaint: edema     S: Patient ha No results for input(s): TROP, CK in the last 168 hours. Imaging: Imaging data reviewed in Epic.     Medications:   • Potassium Chloride ER  40 mEq Oral Once   • furosemide  80 mg Oral Daily   • acetaminophen  650 mg Oral Once   • simethicone

## 2020-10-06 NOTE — PROGRESS NOTES
[facility]  Nephrology Progress Note    Leta Rock Patient Status:  Inpatient    1960 MRN [id number]   [facility] 8NE-A Attending Calos Leos MD   Hosp Day # 6 PCP August Gilford, MD       SUBJECTIVE:  No acute events no 10/03/20  0553 10/04/20  0532 10/05/20  0549 10/05/20  2206 10/06/20  0537      < > 141 141 141 143  --  143   K 3.5   < > 3.4* 2.9* 2.9* 3.1* 3.3* 3.5  3.5      < > 103 100 97* 100  --  102   CO2 28.0   < > 34.0* 38.0* 43.0* 39.0*  --  41.0* Min PRN    Or    •  dextrose 50 % injection 50 mL, 50 mL, Intravenous, Q15 Min PRN    Or    •  glucose (DEX4) oral liquid 30 g, 30 g, Oral, Q15 Min PRN    Or    •  Glucose-Vitamin C (DEX-4) chewable tab 8 tablet, 8 tablet, Oral, Q15 Min PRN          Chases

## 2020-10-06 NOTE — PAYOR COMM NOTE
--------------  CONTINUED STAY REVIEW    Payor: [id number] [place] [address] #:  [id number]  Authorization Number: [id number]    Admit date: 9/25/20  Admit time: 65    Admitting Physician: Carrie Unger MD  Attending Physician:  Love Padilla MD Diya Vivas Patient Status:  Inpatient    1960 MRN [id number]   [facility] 8NE-A Attending Jose Castillo MD   Hosp Day # 11 PCP Lilibeth Romero MD         SUBJECTIVE:  No acute events noted, stable this AM           Physical Exam 4297 10/04/20  0532 10/05/20  0549 10/05/20  2206 10/06/20  0537      < > 141 141 141 143  --  143   K 3.5   < > 3.4* 2.9* 2.9* 3.1* 3.3* 3.5  3.5      < > 103 100 97* 100  --  102   CO2 28.0   < > 34.0* 38.0* 43.0* 39.0*  --  41.0*   BUN 31* •  Glucose-Vitamin C (DEX-4) chewable tab 4 tablet, 4 tablet, Oral, Q15 Min PRN    Or     •  dextrose 50 % injection 50 mL, 50 mL, Intravenous, Q15 Min PRN    Or     •  glucose (DEX4) oral liquid 30 g, 30 g, Oral, Q15 Min PRN    Or     •  Glucose-Vitamin C

## 2020-10-07 VITALS
HEIGHT: 68 IN | TEMPERATURE: 99 F | HEART RATE: 69 BPM | WEIGHT: 315 LBS | DIASTOLIC BLOOD PRESSURE: 59 MMHG | OXYGEN SATURATION: 92 % | RESPIRATION RATE: 20 BRPM | BODY MASS INDEX: 47.74 KG/M2 | SYSTOLIC BLOOD PRESSURE: 143 MMHG

## 2020-10-07 PROCEDURE — 99239 HOSP IP/OBS DSCHRG MGMT >30: CPT | Performed by: INTERNAL MEDICINE

## 2020-10-07 PROCEDURE — 99232 SBSQ HOSP IP/OBS MODERATE 35: CPT | Performed by: INTERNAL MEDICINE

## 2020-10-07 RX ORDER — MIDODRINE HYDROCHLORIDE 5 MG/1
5 TABLET ORAL 3 TIMES DAILY
Qty: 90 TABLET | Refills: 0 | Status: SHIPPED | OUTPATIENT
Start: 2020-10-07 | End: 2020-11-06

## 2020-10-07 RX ORDER — POTASSIUM CHLORIDE 20 MEQ/1
20 TABLET, EXTENDED RELEASE ORAL DAILY
Qty: 10 TABLET | Refills: 0 | Status: SHIPPED | OUTPATIENT
Start: 2020-10-08

## 2020-10-07 RX ORDER — FOLIC ACID 1 MG/1
1 TABLET ORAL DAILY
Qty: 30 TABLET | Refills: 0 | Status: SHIPPED | OUTPATIENT
Start: 2020-10-08

## 2020-10-07 RX ORDER — POTASSIUM CHLORIDE 20 MEQ/1
20 TABLET, EXTENDED RELEASE ORAL DAILY
Status: DISCONTINUED | OUTPATIENT
Start: 2020-10-07 | End: 2020-10-07

## 2020-10-07 NOTE — CM/SW NOTE
Received call from [facility] that patient has been approved for CASTRO stay effective 10 7 20 to 10 16 20--auth # [id number]--[employer] manager clinton MEAD   extension [phone]--qikzizr spouse by phone

## 2020-10-07 NOTE — PLAN OF CARE
Assumed care of patient at [facility]. Patient AOX4. Oxygenating >90% on 2L NC while awake, CPAP while asleep. NSR on tele. VSS. QID glucs. Denies pain or SOB. Generalized edema noted to bilateral upper and lower extremities. Continue Lasix as ordered.  Daily daily Assess ability and encourage patient to participate in ADLs to maximize function  - Promote sitting position while performing ADLs such as feeding, grooming, and bathing  - Educate and encourage patient/family in tolerated functional activity level and pre balance  Outcome: Progressing  Goal: Maintains or returns to baseline bowel function  Description: INTERVENTIONS:  - Assess bowel function  - Maintain adequate hydration with IV or PO as ordered and tolerated  - Evaluate effectiveness of GI medications  -

## 2020-10-07 NOTE — CODE DOCUMENTATION
Care Progression Note:  Active Acute Medical Issue:   Rectal bleeding     Other Contributing Medical Factors/Dx. :     Length of stay: 12  Avoidable Delays: insurance auth started 10/5, after peer review as will as additional clinical review--auth obtained

## 2020-10-07 NOTE — DISCHARGE SUMMARY
[facility] HOSPITALIST  DISCHARGE SUMMARY     John Sharma Patient Status:  Inpatient    1960 MRN [id number]   [facility] 8NE-A Attending No att. providers found   Hosp Day # 12 PCP Jeyson Chan MD     Date of Admission: 2020 readmission after discharge from the hospital.    Procedures during hospitalization:   • EGD, Cscope    Consultants:  • Nephrology, GI, hematology, ICU, palliative care    Discharge Medication List:     Discharge Medications      START taking these medicat apixaban 5 MG Tabs  Commonly known as: ELIQUIS        aspirin 81 MG Tbec        gabapentin 300 MG Caps  Commonly known as: NEURONTIN        guaiFENesin  MG Tb12  Commonly known as: MUCINEX        lactulose 10 GM/15ML Soln  Commonly known as: Yudith Cortes

## 2020-10-07 NOTE — PAYOR COMM NOTE
--------------  CONTINUED STAY REVIEW    Payor: [address] [place] [address] #:  [id number]  Authorization Number: [id number]    Admit date: 9/25/20  Admit time: 65    Admitting Physician: Maria Del Rosario Lopez MD  Attending Physician:  Maria Del Rosario Lopez MD Intake/Output Summary (Last 24 hours) at 10/5/2020 0950  Last data filed at 10/5/2020 0800      Gross per 24 hour   Intake 220 ml   Output 1175 ml   Net -955 ml      Last 3 Weights  10/05/20 0500 : (!) 348 lb 5.2 oz (158 kg)  10/04/20 0626 : (!) 350 lb 12 ALB 2.6*   < > 2.9* 3.5 3.4 3.7 3.4     --   --   --   --   --   --     < > = values in this interval not displayed.                 Recent Labs   Lab 10/04/20  0757 10/04/20  1252 10/04/20  1737 10/04/20  2213 10/05/20  0711   PGLU 78 134* 161* 156 #1.  RASHAD/CKD-Baseline creatinine is unknown although he does have longstanding diabetes and hypertension and his wife reports that he was recently referred to nephrology as an outpatient.   He also question of ongoing hepatorenal syndrome given his overall

## 2020-10-07 NOTE — PAYOR COMM NOTE
--------------  CONTINUED STAY REVIEW    Payor: [address] [place] [address] #:  [id number]  Authorization Number: [id number]    Admit date: 9/25/20  Admit time: 65    Admitting Physician: Heather Asencio MD  Attending Physician:  Heather Asecnio MD Intake/Output Summary (Last 24 hours) at 10/7/2020 0911  Last data filed at 10/7/2020 0641      Gross per 24 hour   Intake 670 ml   Output 725 ml   Net -55 ml      Last 3 Weights  10/07/20 0638 : (!) 354 lb 0.9 oz (160.6 kg)  10/07/20 0421 : (!) 355 lb 13. 4557 10/03/20  0553 10/04/20  0532 10/05/20  0549   ALT 23 25 23 19 18   AST 53* 55* 43* 43* 44*   ALB 2.9* 3.5 3.4 3.7 3.4                 Recent Labs   Lab 10/06/20  0750 10/06/20  1222 10/06/20  1825 10/06/20  2058 10/07/20  0815   PGLU 81 145* 124* 110 #1.  RASHAD/CKD-Baseline creatinine is unknown although he does have longstanding diabetes and hypertension and his wife reports that he was recently referred to nephrology as an outpatient at the [place].   He also question of ongoing hepatorenal syndrome given hi

## 2020-10-07 NOTE — PROGRESS NOTES
[facility]PITAL[facility]  Progress Note     Lidia Aleman Patient Status:  Inpatient    1960 MRN [id number]   [facility] 8NE-A Attending Crystal Estrella MD   Hosp Day # 12 PCP Keira Taylor MD     Chief Complaint: cirrhosis     S: Carrie Arriaga 4.2*  --   --   --   --    TP 5.8* 5.9* 5.7*  --   --   --   --     < > = values in this interval not displayed. Estimated Creatinine Clearance: 29.1 mL/min (A) (based on SCr of 2.55 mg/dL (H)).     Recent Labs   Lab 10/01/20  0546   PTP 38.7*   INR 3 status: full  · Ramos:no      Will the patient be referred to [facility] on discharge?: no  Estimated date of discharge:today  Home vs SNF     Plan of care discussed with patient.     Michelle Martinez MD

## 2020-10-07 NOTE — RESPIRATORY THERAPY NOTE
FELIX : EQUIPMENT USE: DAILY SUMMARY                                            SET MODE:AUTO CPAP WITH CFLEX                                          USAGE IN HOURS:8:08                                          90%

## 2020-10-07 NOTE — PLAN OF CARE
A&O X 4. Calm, cooperative. VSS. NSR on cardiac monitor. . Attempted wean 02 but requires 1-2 L NC at rest. Lungs clear, diminished. IS encouraged. Denied SOB. No chest pain. Right toe from previous amputation with sutures intact.  No redness,  and gait  - Educate and engage patient/family in tolerated activity level and precautions  -Ambulate 3+ times per day in hallway with assist  Outcome: Progressing     Problem: Impaired Activities of Daily Living  Goal: Achieve highest/safest level of indep

## 2020-10-07 NOTE — CM/SW NOTE
10/07/20 1200   Discharge disposition   Expected discharge disposition Skilled Nurs   Name of Facillity/Home Care/Hospice   ([address])   Discharge transportation [address]     Pt has Carmen Quiver for admission today to [facility].   RN t

## 2020-10-07 NOTE — PROGRESS NOTES
[facility]  Nephrology Progress Note    Silvano Cameron Patient Status:  Inpatient    1960 MRN [id number]   [facility] 8NE-A Attending Akshat Gregory MD   Hosp Day # 12 PCP David Wynn MD       SUBJECTIVE:  Denies c/o this AM Recent Labs   Lab 10/03/20  0553 10/04/20  0532 10/05/20  0549 10/05/20  2206 10/06/20  0537 10/06/20  1200 10/07/20  0504    141 143  --  143  --  143   K 2.9* 2.9* 3.1* 3.3* 3.5  3.5 3.9 3.6    97* 100  --  102  --  104   CO2 38.0* 43. 0 (DEX-4) chewable tab 4 tablet, 4 tablet, Oral, Q15 Min PRN    Or    •  dextrose 50 % injection 50 mL, 50 mL, Intravenous, Q15 Min PRN    Or    •  glucose (DEX4) oral liquid 30 g, 30 g, Oral, Q15 Min PRN    Or    •  Glucose-Vitamin C (DEX-4) chewable tab 8

## 2020-10-08 ENCOUNTER — NURSE ONLY (OUTPATIENT)
Dept: LAB | Age: 60
End: 2020-10-08
Attending: INTERNAL MEDICINE
Payer: MEDICARE

## 2020-10-08 DIAGNOSIS — R69 DIAGNOSIS UNKNOWN: Primary | ICD-10-CM

## 2020-10-08 PROCEDURE — 85025 COMPLETE CBC W/AUTO DIFF WBC: CPT

## 2020-10-08 PROCEDURE — 80053 COMPREHEN METABOLIC PANEL: CPT

## 2020-10-08 PROCEDURE — 36415 COLL VENOUS BLD VENIPUNCTURE: CPT

## 2020-10-08 NOTE — PAYOR COMM NOTE
--------------  DISCHARGE REVIEW    Payor: [address] #:  [id number]  Authorization Number: [id number]    Admit date: 9/25/20  Admit time:  1700  Discharge Date: 10/7/2020  3:33 PM     Admitting Physician: Roderick Martinez MD  Attendin

## 2020-10-08 NOTE — PAYOR COMM NOTE
--------------  10/6 CONTINUED STAY REVIEW    Payor: [address] [place] [address] #:  [id number]  Authorization Number: [id number]      HOSPITALIST:   Plan to DC to [facility] pending insurance auth.   Likely today      Gloria Solomon RN

## 2020-12-10 ENCOUNTER — EXTERNAL RECORD (OUTPATIENT)
Dept: HEALTH INFORMATION MANAGEMENT | Facility: OTHER | Age: 60
End: 2020-12-10

## 2021-01-01 ENCOUNTER — EXTERNAL RECORD (OUTPATIENT)
Dept: HEALTH INFORMATION MANAGEMENT | Facility: OTHER | Age: 61
End: 2021-01-01

## 2021-11-29 NOTE — DIETARY NOTE
[facility]     Admitting diagnosis:  Rectal bleeding [K62.5]  Thrombocytopenia ([facility] [address] [D69.6]  Hypotension, unspecified hypotension type [I95.9]  Anemia, unspecified type [D64.9]  Renal failure, unspecified chron November 29, 2021       [place]  [address] [address] [facility] [place] [address]  Via Fax: [phone]      Patient: Popeye Rosenbaum   YOB: 1953   Date of Visit: 11/29/2021       Dear Dr. Fontenot:    Thank you for referring Popeye Rosenbaum to me for evaluation. Below are my notes for this visit with him.    If you have questions, please do not hesitate to call me. I look forward to following your patient along with you.      Sincerely,        Rudy Guillen MD        CC: No Recipients  Rudy Guillen MD  11/29/2021  2:03 PM  Signed  Cardiology Office Visit    Chief Complaint/Reason for Visit:   Chief Complaint   Patient presents with   • Office Visit     9 month follow up   • Referral     PCP- Dr. Fontenot         HISTORY OF PRESENT ILLNESS: Popeye Rosenbaum is a 68 year old male with  Coronary artery disease status post bypass graft surgery 1996. Cardiac catheterization on 6/6/2018 showed total occlusion of all grafts. He underwent Complex multivessel PCI of distal left main, proximal circumflex and obtuse marginal using balloon angioplasty and stenting  Hypertension  Familial hyper cholesterolemia  diabetes mellitus on Metformin  Intolerance to all statin in the past  w severe myalgias on  atorvastatin and rosuvastatin w LDL ~135 okay.  Patient can does not believe taking medications for cholesterol.  He was on Orchin  in the past.  He was again instructed about the benefits of PCSK9 inhibitors.  Is aware about the risks and benefits but insist on no medications for his hypercholesterolemia     He feels well he has no chest pain he has no angina.  He has no shortness of breath he has no syncope.  He has no palpitation.  He has no orthopnea or nocturnal paroxysmal dyspnea      REVIEW OF SYSTEMS:  All other systems negative, except as noted above in HPI    PAST MEDICAL HISTORY:   Past Medical History:   Diagnosis Date   • CAD (coronary artery disease)     • Diabetes mellitus (CMS/HCC)    • Familial hyperlipidemia    • Hypertension    • S/P CABG (coronary artery bypass graft)    • S/P coronary artery stent placement    • Statin intolerance    • Thrombus    • Thrombus of aorta (CMS/HCC)        PAST SURGICAL HISTORY:   Past Surgical History:   Procedure Laterality Date   • Coronary artery bypass graft     • Hernia repair         FAMILY HISTORY:   Family History   Problem Relation Age of Onset   • Heart disease Father        SOCIAL HISTORY:   Social History     Tobacco Use   • Smoking status: Never Smoker   • Smokeless tobacco: Never Used   Substance Use Topics   • Alcohol use: No   • Drug use: Not on file       Drug Use:    Not Asked         ALLERGIES:   ALLERGIES:   Allergen Reactions   • Atorvastatin MYALGIA     Per pt doesn't do well with statins   • Indocin Other (See Comments)     Unknown       MEDICATIONS:   Current Medications    AMLODIPINE (NORVASC) 10 MG TABLET    TAKE 1 TABLET BY MOUTH DAILY    ASPIRIN 81 MG TABLET    Take 81 mg by mouth daily.    CHOLECALCIFEROL (D3 VITAMIN PO)    Take 10,000 Units by mouth.    COENZYME Q10 (COQ10) 200 MG CAP        FISH OIL-KRILL OIL PO        LOSARTAN (COZAAR) 50 MG TABLET    TAKE 1 TABLET BY MOUTH EVERY EVENING    MENAQUINONE-7 (VITAMIN K2) 100 MCG CAP    Take 200 mcg by mouth.    METFORMIN (GLUCOPHAGE) 1000 MG TABLET    Take 500 mg by mouth 2 times daily (with meals).     METOPROLOL TARTRATE (LOPRESSOR) 50 MG TABLET    TAKE 1 TABLET BY MOUTH TWICE DAILY    MULTIPLE VITAMIN (MULTIVITAMIN ADULT PO)        XARELTO 2.5 MG TAB    TAKE 1 TABLET BY MOUTH TWICE DAILY WITH MEALS        PHYSICAL  EXAMINATION  Visit Vitals  Ht 5' 8\" (1.727 m)   Wt 75.8 kg (167 lb)   BMI 25.39 kg/m²         Physical Exam  Vitals and nursing note reviewed.   Constitutional:       Appearance: He is well-developed.   HENT:      Head: Normocephalic.   Cardiovascular:      Rate and Rhythm: Normal rate.   Pulmonary:      Breath sounds: Normal breath  sounds.   Abdominal:      Palpations: Abdomen is soft.   Musculoskeletal:         General: Normal range of motion.      Cervical back: Neck supple.   Skin:     General: Skin is warm.   Neurological:      Mental Status: He is alert.           Labs: Reviewed     CHOLESTEROL   Date Value Ref Range Status   05/25/2018 313 (H) <200 mg/dl Final     Comment:        Desirable            <200  Borderline High      200 to 239  High                 >=240          CALCULATED LDL   Date Value Ref Range Status   05/25/2018 219 (H) <130 mg/dl Final     Comment:     OPTIMAL               <100  NEAR OPTIMAL          100-129  BORDERLINE HIGH       130-159  HIGH                  160-189  VERY HIGH             >=190     05/25/2018 219 (H) <130 mg/dL Final     Comment:     OPTIMAL               <100  NEAR OPTIMAL          100-129  BORDERLINE HIGH       130-159  HIGH                  160-189  VERY HIGH             >=190  [facility]  [address] [place] [address], [id number]       HDL   Date Value Ref Range Status   05/25/2018 34 (L) >39 mg/dl Final     Comment:     Low            <40  Borderline Low 40 to 49  Near Optimal   50 to 59  Optimal        >=60       TRIGLYCERIDE   Date Value Ref Range Status   05/25/2018 298 (H) <150 mg/dl Final     Comment:     Normal                   <150  Borderline High          150 to 199  High                     200 to 499  Very High                >=500       CHOL/HDL   Date Value Ref Range Status   05/25/2018 9.2 (H) <4.5   Final   05/25/2018 9.2 (H) <4.5 Final     Comment:     [facility]  [address] [place] [address], [id number]            IMPRESSION/PLAN:    Statin intolerance  Patient stopped taking rosuvastatin and atorvastatin  because of myalgia. Could not tolerate simvasttatin, pravastatin , fluvastatin in the past. Not interested in PCSk9      CAD (coronary artery disease)  s/p  Cardiac cath 06/04/2018>> all grafts from CABG 22 yrs ago  were occluded.Pt had  complex multivessel PCI of distal left main, proximal circumflex and obtuse margina using balloon angioplasty and stenting.  Doing well on current medical regimen.        Familial hyperlipidemia   LDL cholesterol greater than 200 mg percent.  Resistant taking taking statins because of myalgias  Pt was on Orchin diet.prefers dietary approach to traet high Cholesterol  Does not want to take PCSK9 inhibitor RX    Essential hypertension  Hypertension is controlled    S/P CABG (coronary artery bypass graft)  · All grafts occluded on cath 2018 necessiting complex multivessel PCI      S/P coronary artery stent placement  Had multivessel PCI and stent placement in 2018  On aspirin and vascular dose Xarelto    Carotid atherosclerosis  Mild plaquing With no significant obstructive disease on carotid duplex on carotid duplex scan 2018  Continue aspirin and vascular dose Xarelto    Celiac artery stenosis (CMS/HCC)  80% celiac artery stenosis on CTA abdomen 2018.  Patient has no symptoms.  No plan for intervention  Refuses to take cholesterol-lowering medications including PCSK9    Renal artery stenosis (CMS/HCC)  70-80% Rt GEO on CTA abdomen 2018; patient blood pressure is well controlled.  No plan for intervention on the renal artery stenosis    Atherosclerosis of aorta (CMS/HCC)     CT abdomen 2018  1.Moderate aortic disease with distal bilobed aneurysm measuring up to 2 x 2 cm in diameter.  2.   Celiac origin high-grade stenosis measuring up to 80%.  3.   SMA origin moderate stenosis measuring 40-50%.  4.  , Dominant right renal arteries with moderate/high-grade stenosis measuring up to 70-80%.      No orders of the defined types were placed in this encounter.    Not interested in pharmacological Rx for hyperlipidemia    11/29/2021      Johnathon Guillen MD  Cardiology

## 2022-01-01 ENCOUNTER — EXTERNAL RECORD (OUTPATIENT)
Dept: OTHER | Age: 62
End: 2022-01-01

## 2022-02-15 LAB
SARS-COV-2 RNA SPEC QL NAA+PROBE: NEGATIVE
SPECIMEN SOURCE: NORMAL

## 2022-02-18 ENCOUNTER — HOSPITAL ENCOUNTER (EMERGENCY)
Age: 62
Discharge: HOME | End: 2022-02-18
Attending: EMERGENCY MEDICINE

## 2022-02-18 ENCOUNTER — APPOINTMENT (OUTPATIENT)
Dept: INTERVENTIONAL RADIOLOGY/VASCULAR | Age: 62
End: 2022-02-18
Attending: EMERGENCY MEDICINE

## 2022-02-18 VITALS
DIASTOLIC BLOOD PRESSURE: 86 MMHG | OXYGEN SATURATION: 96 % | SYSTOLIC BLOOD PRESSURE: 151 MMHG | RESPIRATION RATE: 12 BRPM | TEMPERATURE: 96.8 F | WEIGHT: 263.89 LBS | HEART RATE: 86 BPM

## 2022-02-18 DIAGNOSIS — T85.528A JEJUNOSTOMY TUBE FELL OUT: Primary | ICD-10-CM

## 2022-02-18 LAB
FLUAV RNA RESP QL NAA+PROBE: NOT DETECTED
FLUBV RNA RESP QL NAA+PROBE: NOT DETECTED
RSV AG NPH QL IA.RAPID: NOT DETECTED
SARS-COV-2 RNA RESP QL NAA+PROBE: NOT DETECTED
SERVICE CMNT-IMP: NORMAL
SERVICE CMNT-IMP: NORMAL

## 2022-02-18 PROCEDURE — C1769 GUIDE WIRE: HCPCS

## 2022-02-18 PROCEDURE — C9803 HOPD COVID-19 SPEC COLLECT: HCPCS

## 2022-02-18 PROCEDURE — 10006023 HB SUPPLY 272

## 2022-02-18 PROCEDURE — C1887 CATHETER, GUIDING: HCPCS

## 2022-02-18 PROCEDURE — 49446 CHANGE G-TUBE TO G-J PERC: CPT

## 2022-02-18 PROCEDURE — 10002805 HB CONTRAST AGENT: Performed by: EMERGENCY MEDICINE

## 2022-02-18 PROCEDURE — 99283 EMERGENCY DEPT VISIT LOW MDM: CPT | Performed by: STUDENT IN AN ORGANIZED HEALTH CARE EDUCATION/TRAINING PROGRAM

## 2022-02-18 PROCEDURE — 99285 EMERGENCY DEPT VISIT HI MDM: CPT

## 2022-02-18 PROCEDURE — 0241U COVID/FLU/RSV PANEL: CPT | Performed by: EMERGENCY MEDICINE

## 2022-02-18 RX ADMIN — IOHEXOL 65 ML: 300 INJECTION, SOLUTION INTRAVENOUS at 13:44

## 2022-02-18 ASSESSMENT — PAIN SCALES - PAIN ASSESSMENT IN ADVANCED DEMENTIA (PAINAD)
BODYLANGUAGE: RELAXED
TOTALSCORE: 0
CONSOLABILITY: NO NEED TO CONSOLE
FACIALEXPRESSION: SMILING OR INEXPRESSIVE
BREATHING: NORMAL

## 2022-03-17 ENCOUNTER — HOSPITAL ENCOUNTER (OUTPATIENT)
Age: 62
Setting detail: OBSERVATION
Discharge: NURSING, REHAB OR LONG-TERM CARE | End: 2022-03-18
Attending: EMERGENCY MEDICINE | Admitting: INTERNAL MEDICINE

## 2022-03-17 ENCOUNTER — APPOINTMENT (OUTPATIENT)
Dept: GENERAL RADIOLOGY | Age: 62
End: 2022-03-17
Attending: INTERNAL MEDICINE

## 2022-03-17 ENCOUNTER — APPOINTMENT (OUTPATIENT)
Dept: INTERVENTIONAL RADIOLOGY/VASCULAR | Age: 62
End: 2022-03-17
Attending: STUDENT IN AN ORGANIZED HEALTH CARE EDUCATION/TRAINING PROGRAM

## 2022-03-17 DIAGNOSIS — Z78.9 ENCOUNTER FOR GASTROJEJUNAL (GJ) TUBE PLACEMENT: Primary | ICD-10-CM

## 2022-03-17 DIAGNOSIS — K94.13 MALFUNCTION OF JEJUNOSTOMY TUBE (CMD): ICD-10-CM

## 2022-03-17 LAB
ALBUMIN SERPL-MCNC: 3.3 G/DL (ref 3.6–5.1)
ALBUMIN/GLOB SERPL: 0.6 {RATIO} (ref 1–2.4)
ALP SERPL-CCNC: 140 UNITS/L (ref 45–117)
ALT SERPL-CCNC: 29 UNITS/L
ANION GAP SERPL CALC-SCNC: 7 MMOL/L (ref 10–20)
AST SERPL-CCNC: 19 UNITS/L
BASOPHILS # BLD: 0 K/MCL (ref 0–0.3)
BASOPHILS NFR BLD: 0 %
BILIRUB SERPL-MCNC: 0.2 MG/DL (ref 0.2–1)
BUN SERPL-MCNC: 29 MG/DL (ref 6–20)
BUN/CREAT SERPL: 30 (ref 7–25)
CALCIUM SERPL-MCNC: 10.5 MG/DL (ref 8.4–10.2)
CHLORIDE SERPL-SCNC: 105 MMOL/L (ref 98–107)
CO2 SERPL-SCNC: 32 MMOL/L (ref 21–32)
CREAT SERPL-MCNC: 0.96 MG/DL (ref 0.67–1.17)
DEPRECATED RDW RBC: 47.1 FL (ref 39–50)
EOSINOPHIL # BLD: 0.4 K/MCL (ref 0–0.5)
EOSINOPHIL NFR BLD: 5 %
ERYTHROCYTE [DISTWIDTH] IN BLOOD: 14.1 % (ref 11–15)
FASTING DURATION TIME PATIENT: ABNORMAL H
FLUAV RNA RESP QL NAA+PROBE: NOT DETECTED
FLUBV RNA RESP QL NAA+PROBE: NOT DETECTED
GFR SERPLBLD BASED ON 1.73 SQ M-ARVRAT: >90 ML/MIN
GLOBULIN SER-MCNC: 5.2 G/DL (ref 2–4)
GLUCOSE SERPL-MCNC: 125 MG/DL (ref 70–99)
HCT VFR BLD CALC: 36.1 % (ref 39–51)
HGB BLD-MCNC: 11.4 G/DL (ref 13–17)
IMM GRANULOCYTES # BLD AUTO: 0 K/MCL (ref 0–0.2)
IMM GRANULOCYTES # BLD: 0 %
LYMPHOCYTES # BLD: 1.3 K/MCL (ref 1–4)
LYMPHOCYTES NFR BLD: 19 %
MCH RBC QN AUTO: 29.4 PG (ref 26–34)
MCHC RBC AUTO-ENTMCNC: 31.6 G/DL (ref 32–36.5)
MCV RBC AUTO: 93 FL (ref 78–100)
MONOCYTES # BLD: 0.6 K/MCL (ref 0.3–0.9)
MONOCYTES NFR BLD: 8 %
NEUTROPHILS # BLD: 4.6 K/MCL (ref 1.8–7.7)
NEUTROPHILS NFR BLD: 68 %
NRBC BLD MANUAL-RTO: 0 /100 WBC
PLATELET # BLD AUTO: 214 K/MCL (ref 140–450)
POTASSIUM SERPL-SCNC: 3.8 MMOL/L (ref 3.4–5.1)
PROT SERPL-MCNC: 8.5 G/DL (ref 6.4–8.2)
RBC # BLD: 3.88 MIL/MCL (ref 4.5–5.9)
RSV AG NPH QL IA.RAPID: NOT DETECTED
SARS-COV-2 RNA RESP QL NAA+PROBE: NOT DETECTED
SERVICE CMNT-IMP: NORMAL
SERVICE CMNT-IMP: NORMAL
SODIUM SERPL-SCNC: 140 MMOL/L (ref 135–145)
WBC # BLD: 6.9 K/MCL (ref 4.2–11)

## 2022-03-17 PROCEDURE — 99283 EMERGENCY DEPT VISIT LOW MDM: CPT | Performed by: STUDENT IN AN ORGANIZED HEALTH CARE EDUCATION/TRAINING PROGRAM

## 2022-03-17 PROCEDURE — 74018 RADEX ABDOMEN 1 VIEW: CPT

## 2022-03-17 PROCEDURE — 10002805 HB CONTRAST AGENT: Performed by: INTERNAL MEDICINE

## 2022-03-17 PROCEDURE — 10006027 HB SUPPLY 278

## 2022-03-17 PROCEDURE — 49452 REPLACE G-J TUBE PERC: CPT

## 2022-03-17 PROCEDURE — 99285 EMERGENCY DEPT VISIT HI MDM: CPT

## 2022-03-17 PROCEDURE — 10004651 HB RX, NO CHARGE ITEM: Performed by: INTERNAL MEDICINE

## 2022-03-17 PROCEDURE — C1769 GUIDE WIRE: HCPCS

## 2022-03-17 PROCEDURE — G0378 HOSPITAL OBSERVATION PER HR: HCPCS

## 2022-03-17 PROCEDURE — 85025 COMPLETE CBC W/AUTO DIFF WBC: CPT | Performed by: STUDENT IN AN ORGANIZED HEALTH CARE EDUCATION/TRAINING PROGRAM

## 2022-03-17 PROCEDURE — 80053 COMPREHEN METABOLIC PANEL: CPT | Performed by: STUDENT IN AN ORGANIZED HEALTH CARE EDUCATION/TRAINING PROGRAM

## 2022-03-17 PROCEDURE — 10006023 HB SUPPLY 272

## 2022-03-17 PROCEDURE — 36415 COLL VENOUS BLD VENIPUNCTURE: CPT

## 2022-03-17 PROCEDURE — 96361 HYDRATE IV INFUSION ADD-ON: CPT

## 2022-03-17 PROCEDURE — 10002807 HB RX 258: Performed by: INTERNAL MEDICINE

## 2022-03-17 PROCEDURE — 0241U COVID/FLU/RSV PANEL: CPT | Performed by: STUDENT IN AN ORGANIZED HEALTH CARE EDUCATION/TRAINING PROGRAM

## 2022-03-17 PROCEDURE — 96360 HYDRATION IV INFUSION INIT: CPT

## 2022-03-17 PROCEDURE — 10004281 HB COUNTER-STAFF TIME PER 15 MIN

## 2022-03-17 PROCEDURE — 10002801 HB RX 250 W/O HCPCS: Performed by: INTERNAL MEDICINE

## 2022-03-17 PROCEDURE — 10002803 HB RX 637: Performed by: INTERNAL MEDICINE

## 2022-03-17 PROCEDURE — 13003289 HB OXYGEN THERAPY DAILY

## 2022-03-17 RX ORDER — ATORVASTATIN CALCIUM 40 MG/1
40 TABLET, FILM COATED ORAL AT BEDTIME
COMMUNITY

## 2022-03-17 RX ORDER — SENNOSIDES A AND B 8.6 MG/1
2 TABLET, FILM COATED ORAL DAILY
COMMUNITY

## 2022-03-17 RX ORDER — SENNOSIDES A AND B 8.6 MG/1
2 TABLET, FILM COATED ORAL DAILY
Status: DISCONTINUED | OUTPATIENT
Start: 2022-03-17 | End: 2022-03-18 | Stop reason: HOSPADM

## 2022-03-17 RX ORDER — MYCOPHENOLATE MOFETIL 200 MG/ML
500 POWDER, FOR SUSPENSION ORAL 2 TIMES DAILY
COMMUNITY

## 2022-03-17 RX ORDER — 0.9 % SODIUM CHLORIDE 0.9 %
2 VIAL (ML) INJECTION EVERY 12 HOURS SCHEDULED
Status: DISCONTINUED | OUTPATIENT
Start: 2022-03-17 | End: 2022-03-18

## 2022-03-17 RX ORDER — DOXAZOSIN 2 MG/1
2 TABLET ORAL NIGHTLY
COMMUNITY

## 2022-03-17 RX ORDER — CARVEDILOL 6.25 MG/1
6.25 TABLET ORAL 2 TIMES DAILY WITH MEALS
Status: DISCONTINUED | OUTPATIENT
Start: 2022-03-17 | End: 2022-03-18 | Stop reason: HOSPADM

## 2022-03-17 RX ORDER — TRAMADOL HYDROCHLORIDE 50 MG/1
50 TABLET ORAL EVERY 12 HOURS SCHEDULED
Status: DISCONTINUED | OUTPATIENT
Start: 2022-03-17 | End: 2022-03-18 | Stop reason: HOSPADM

## 2022-03-17 RX ORDER — ESOMEPRAZOLE MAGNESIUM 40 MG/1
40 GRANULE, DELAYED RELEASE ORAL 2 TIMES DAILY
COMMUNITY

## 2022-03-17 RX ORDER — LORAZEPAM 1 MG/1
1 TABLET ORAL EVERY 12 HOURS PRN
COMMUNITY

## 2022-03-17 RX ORDER — LEVETIRACETAM 1000 MG/1
2000 TABLET ORAL 2 TIMES DAILY
COMMUNITY

## 2022-03-17 RX ORDER — SODIUM CHLORIDE 9 MG/ML
INJECTION, SOLUTION INTRAVENOUS CONTINUOUS
Status: DISCONTINUED | OUTPATIENT
Start: 2022-03-17 | End: 2022-03-18

## 2022-03-17 RX ORDER — ACETAMINOPHEN 325 MG/1
650 TABLET ORAL EVERY 4 HOURS PRN
Status: DISCONTINUED | OUTPATIENT
Start: 2022-03-17 | End: 2022-03-18 | Stop reason: HOSPADM

## 2022-03-17 RX ORDER — PREDNISONE 1 MG/1
2.5 TABLET ORAL 2 TIMES DAILY
Status: DISCONTINUED | OUTPATIENT
Start: 2022-03-17 | End: 2022-03-18 | Stop reason: HOSPADM

## 2022-03-17 RX ORDER — LEVETIRACETAM 500 MG/1
2000 TABLET ORAL 2 TIMES DAILY
Status: DISCONTINUED | OUTPATIENT
Start: 2022-03-17 | End: 2022-03-18 | Stop reason: HOSPADM

## 2022-03-17 RX ORDER — ONDANSETRON 2 MG/ML
4 INJECTION INTRAMUSCULAR; INTRAVENOUS EVERY 12 HOURS PRN
Status: DISCONTINUED | OUTPATIENT
Start: 2022-03-17 | End: 2022-03-18 | Stop reason: HOSPADM

## 2022-03-17 RX ORDER — TRAMADOL HYDROCHLORIDE 50 MG/1
50 TABLET ORAL EVERY 12 HOURS PRN
COMMUNITY

## 2022-03-17 RX ORDER — CARVEDILOL 6.25 MG/1
6.25 TABLET ORAL 2 TIMES DAILY WITH MEALS
COMMUNITY

## 2022-03-17 RX ORDER — URSODIOL 300 MG/1
300 CAPSULE ORAL 2 TIMES DAILY
Status: DISCONTINUED | OUTPATIENT
Start: 2022-03-17 | End: 2022-03-18 | Stop reason: HOSPADM

## 2022-03-17 RX ORDER — MIRTAZAPINE 15 MG/1
30 TABLET, FILM COATED ORAL NIGHTLY
Status: DISCONTINUED | OUTPATIENT
Start: 2022-03-17 | End: 2022-03-18 | Stop reason: HOSPADM

## 2022-03-17 RX ORDER — MIRTAZAPINE 30 MG/1
30 TABLET, FILM COATED ORAL NIGHTLY
COMMUNITY

## 2022-03-17 RX ORDER — POLYETHYLENE GLYCOL 3350 17 G/17G
17 POWDER, FOR SOLUTION ORAL DAILY
COMMUNITY

## 2022-03-17 RX ORDER — TACROLIMUS 1 MG/1
3 CAPSULE ORAL 2 TIMES DAILY
Status: DISCONTINUED | OUTPATIENT
Start: 2022-03-17 | End: 2022-03-17 | Stop reason: SDUPTHER

## 2022-03-17 RX ORDER — URSODIOL 300 MG/1
300 CAPSULE ORAL 2 TIMES DAILY
COMMUNITY

## 2022-03-17 RX ORDER — LORAZEPAM 1 MG/1
1 TABLET ORAL EVERY 12 HOURS SCHEDULED
Status: DISCONTINUED | OUTPATIENT
Start: 2022-03-17 | End: 2022-03-18 | Stop reason: HOSPADM

## 2022-03-17 RX ORDER — LEVOTHYROXINE SODIUM 175 UG/1
175 TABLET ORAL DAILY
COMMUNITY

## 2022-03-17 RX ORDER — GABAPENTIN 300 MG/1
300 CAPSULE ORAL 4 TIMES DAILY
Status: DISCONTINUED | OUTPATIENT
Start: 2022-03-17 | End: 2022-03-18 | Stop reason: HOSPADM

## 2022-03-17 RX ORDER — DOXAZOSIN MESYLATE 1 MG/1
2 TABLET ORAL NIGHTLY
Status: DISCONTINUED | OUTPATIENT
Start: 2022-03-17 | End: 2022-03-18 | Stop reason: HOSPADM

## 2022-03-17 RX ORDER — MYCOPHENOLATE MOFETIL 200 MG/ML
500 POWDER, FOR SUSPENSION ORAL 2 TIMES DAILY
Status: DISCONTINUED | OUTPATIENT
Start: 2022-03-17 | End: 2022-03-18 | Stop reason: HOSPADM

## 2022-03-17 RX ORDER — AMLODIPINE BESYLATE 10 MG/1
10 TABLET ORAL DAILY
COMMUNITY

## 2022-03-17 RX ORDER — TACROLIMUS 1 MG/1
3 CAPSULE ORAL 2 TIMES DAILY
COMMUNITY

## 2022-03-17 RX ORDER — GABAPENTIN 300 MG/1
300 CAPSULE ORAL 4 TIMES DAILY
COMMUNITY

## 2022-03-17 RX ORDER — AMLODIPINE BESYLATE 10 MG/1
10 TABLET ORAL DAILY
Status: DISCONTINUED | OUTPATIENT
Start: 2022-03-17 | End: 2022-03-18 | Stop reason: HOSPADM

## 2022-03-17 RX ORDER — LEVETIRACETAM 500 MG/1
2000 TABLET ORAL 2 TIMES DAILY
Status: DISCONTINUED | OUTPATIENT
Start: 2022-03-17 | End: 2022-03-17

## 2022-03-17 RX ORDER — LANOLIN ALCOHOL/MO/W.PET/CERES
1 CREAM (GRAM) TOPICAL DAILY
Status: ON HOLD | COMMUNITY
End: 2022-04-25

## 2022-03-17 RX ORDER — PREDNISONE 2.5 MG/1
2.5 TABLET ORAL 2 TIMES DAILY
COMMUNITY

## 2022-03-17 RX ORDER — ATORVASTATIN CALCIUM 40 MG/1
40 TABLET, FILM COATED ORAL DAILY
Status: DISCONTINUED | OUTPATIENT
Start: 2022-03-17 | End: 2022-03-18 | Stop reason: HOSPADM

## 2022-03-17 RX ADMIN — TRAMADOL HYDROCHLORIDE 50 MG: 50 TABLET ORAL at 22:27

## 2022-03-17 RX ADMIN — IOHEXOL 30 ML: 300 INJECTION, SOLUTION INTRAVENOUS at 15:05

## 2022-03-17 RX ADMIN — PANTOPRAZOLE 40 MG: 40 TABLET, DELAYED RELEASE ORAL at 16:40

## 2022-03-17 RX ADMIN — APIXABAN 5 MG: 5 TABLET, FILM COATED ORAL at 22:27

## 2022-03-17 RX ADMIN — SODIUM CHLORIDE, PRESERVATIVE FREE 2 ML: 5 INJECTION INTRAVENOUS at 22:29

## 2022-03-17 RX ADMIN — LEVETIRACETAM 2000 MG: 500 TABLET, FILM COATED ORAL at 22:27

## 2022-03-17 RX ADMIN — SENNOSIDES 17.2 MG: 8.6 TABLET ORAL at 16:41

## 2022-03-17 RX ADMIN — DESMOPRESSIN ACETATE 40 MG: 0.2 TABLET ORAL at 16:41

## 2022-03-17 RX ADMIN — APIXABAN 5 MG: 5 TABLET, FILM COATED ORAL at 16:41

## 2022-03-17 RX ADMIN — CARVEDILOL 6.25 MG: 6.25 TABLET, FILM COATED ORAL at 17:23

## 2022-03-17 RX ADMIN — DOXAZOSIN 2 MG: 1 TABLET ORAL at 22:28

## 2022-03-17 RX ADMIN — LEVETIRACETAM 2000 MG: 500 TABLET, FILM COATED ORAL at 16:41

## 2022-03-17 RX ADMIN — MYCOPHENOLATE MOFETIL 500 MG: 200 POWDER, FOR SUSPENSION ORAL at 16:43

## 2022-03-17 RX ADMIN — GABAPENTIN 300 MG: 300 CAPSULE ORAL at 22:26

## 2022-03-17 RX ADMIN — SODIUM CHLORIDE, PRESERVATIVE FREE 2 ML: 5 INJECTION INTRAVENOUS at 08:41

## 2022-03-17 RX ADMIN — TACROLIMUS 3 MG: 5 CAPSULE ORAL at 16:43

## 2022-03-17 RX ADMIN — LORAZEPAM 1 MG: 1 TABLET ORAL at 16:41

## 2022-03-17 RX ADMIN — PREDNISONE 2.5 MG: 1 TABLET ORAL at 22:28

## 2022-03-17 RX ADMIN — MIRTAZAPINE 30 MG: 15 TABLET, FILM COATED ORAL at 22:28

## 2022-03-17 RX ADMIN — SODIUM CHLORIDE: 9 INJECTION, SOLUTION INTRAVENOUS at 12:03

## 2022-03-17 RX ADMIN — LORAZEPAM 1 MG: 1 TABLET ORAL at 22:27

## 2022-03-17 RX ADMIN — LEVOTHYROXINE SODIUM 175 MCG: 100 TABLET ORAL at 16:41

## 2022-03-17 RX ADMIN — AMLODIPINE BESYLATE 10 MG: 10 TABLET ORAL at 16:41

## 2022-03-17 RX ADMIN — GABAPENTIN 300 MG: 300 CAPSULE ORAL at 16:43

## 2022-03-17 RX ADMIN — MYCOPHENOLATE MOFETIL 500 MG: 200 POWDER, FOR SUSPENSION ORAL at 22:32

## 2022-03-17 RX ADMIN — URSODIOL 300 MG: 300 CAPSULE ORAL at 22:31

## 2022-03-17 ASSESSMENT — ENCOUNTER SYMPTOMS
DIZZINESS: 0
VOMITING: 0
AGITATION: 0
FEVER: 0
HEADACHES: 0
DIARRHEA: 0
PHOTOPHOBIA: 0
BACK PAIN: 0
EYE PAIN: 0
ABDOMINAL PAIN: 0
FACIAL SWELLING: 0
ACTIVITY CHANGE: 0
HALLUCINATIONS: 0
BLOOD IN STOOL: 0
CHOKING: 0
COUGH: 0
SINUS PRESSURE: 0
DIAPHORESIS: 0
LIGHT-HEADEDNESS: 0
CHILLS: 0
UNEXPECTED WEIGHT CHANGE: 0
NUMBNESS: 0
FACIAL ASYMMETRY: 0
ABDOMINAL DISTENTION: 0
SEIZURES: 0
CONSTIPATION: 0
SHORTNESS OF BREATH: 0
CHEST TIGHTNESS: 0
NAUSEA: 0
CONFUSION: 0

## 2022-03-17 ASSESSMENT — ACTIVITIES OF DAILY LIVING (ADL)
CONTINENCE: DEPENDENT
FEEDING YOURSELF: DEPENDENT
CHRONIC_PAIN_PRESENT: NO
DRESSING YOURSELF: DEPENDENT
TRANSFERRING: DEPENDENT
ADL_SHORT_OF_BREATH: NO
ADL_SCORE: 0
TOILETING: DEPENDENT
BATHING: DEPENDENT
RECENT_DECLINE_ADL: NO
ADL_BEFORE_ADMISSION: NEEDS/REQUIRES ASSISTANCE

## 2022-03-17 ASSESSMENT — PAIN SCALES - BEHAVIORAL PAIN SCALE (BPS)
BPS_SCORE: 3
BPS_SCORE: 3

## 2022-03-17 ASSESSMENT — LIFESTYLE VARIABLES
HOW OFTEN DO YOU HAVE 6 OR MORE DRINKS ON ONE OCCASION: NEVER
HOW MANY STANDARD DRINKS CONTAINING ALCOHOL DO YOU HAVE ON A TYPICAL DAY: 0,1 OR 2
HOW OFTEN DO YOU HAVE A DRINK CONTAINING ALCOHOL: NEVER
ALCOHOL_USE_STATUS: NO OR LOW RISK WITH VALIDATED TOOL
AUDIT-C TOTAL SCORE: 0

## 2022-03-17 ASSESSMENT — COGNITIVE AND FUNCTIONAL STATUS - GENERAL
ARE YOU BLIND OR DO YOU HAVE SERIOUS DIFFICULTY SEEING, EVEN WHEN WEARING GLASSES: NO
ARE YOU DEAF OR DO YOU HAVE SERIOUS DIFFICULTY  HEARING: NO
BECAUSE OF A PHYSICAL, MENTAL, OR EMOTIONAL CONDITION, DO YOU HAVE DIFFICULTY DOING ERRANDS ALONE: YES
DO YOU HAVE SERIOUS DIFFICULTY WALKING OR CLIMBING STAIRS: YES
DO YOU HAVE DIFFICULTY DRESSING OR BATHING: YES
BECAUSE OF A PHYSICAL, MENTAL, OR EMOTIONAL CONDITION, DO YOU HAVE SERIOUS DIFFICULTY CONCENTRATING, REMEMBERING OR MAKING DECISIONS: YES

## 2022-03-18 VITALS
BODY MASS INDEX: 36.3 KG/M2 | SYSTOLIC BLOOD PRESSURE: 107 MMHG | OXYGEN SATURATION: 92 % | HEART RATE: 73 BPM | WEIGHT: 253.53 LBS | DIASTOLIC BLOOD PRESSURE: 73 MMHG | HEIGHT: 70 IN | TEMPERATURE: 98.2 F | RESPIRATION RATE: 18 BRPM

## 2022-03-18 LAB
ANION GAP SERPL CALC-SCNC: 5 MMOL/L (ref 10–20)
BASOPHILS # BLD: 0 K/MCL (ref 0–0.3)
BASOPHILS NFR BLD: 1 %
BUN SERPL-MCNC: 30 MG/DL (ref 6–20)
BUN/CREAT SERPL: 29 (ref 7–25)
CALCIUM SERPL-MCNC: 10.2 MG/DL (ref 8.4–10.2)
CHLORIDE SERPL-SCNC: 107 MMOL/L (ref 98–107)
CO2 SERPL-SCNC: 35 MMOL/L (ref 21–32)
CREAT SERPL-MCNC: 1.02 MG/DL (ref 0.67–1.17)
DEPRECATED RDW RBC: 48.1 FL (ref 39–50)
EOSINOPHIL # BLD: 0.4 K/MCL (ref 0–0.5)
EOSINOPHIL NFR BLD: 6 %
ERYTHROCYTE [DISTWIDTH] IN BLOOD: 14.1 % (ref 11–15)
FASTING DURATION TIME PATIENT: ABNORMAL H
GFR SERPLBLD BASED ON 1.73 SQ M-ARVRAT: 89 ML/MIN
GLUCOSE BLDC GLUCOMTR-MCNC: 149 MG/DL (ref 70–99)
GLUCOSE SERPL-MCNC: 156 MG/DL (ref 70–99)
HCT VFR BLD CALC: 32.5 % (ref 39–51)
HGB BLD-MCNC: 10 G/DL (ref 13–17)
IMM GRANULOCYTES # BLD AUTO: 0 K/MCL (ref 0–0.2)
IMM GRANULOCYTES # BLD: 0 %
LYMPHOCYTES # BLD: 1.4 K/MCL (ref 1–4)
LYMPHOCYTES NFR BLD: 22 %
MCH RBC QN AUTO: 29.2 PG (ref 26–34)
MCHC RBC AUTO-ENTMCNC: 30.8 G/DL (ref 32–36.5)
MCV RBC AUTO: 94.8 FL (ref 78–100)
MONOCYTES # BLD: 0.7 K/MCL (ref 0.3–0.9)
MONOCYTES NFR BLD: 10 %
NEUTROPHILS # BLD: 4 K/MCL (ref 1.8–7.7)
NEUTROPHILS NFR BLD: 61 %
NRBC BLD MANUAL-RTO: 0 /100 WBC
PLATELET # BLD AUTO: 210 K/MCL (ref 140–450)
POTASSIUM SERPL-SCNC: 4.3 MMOL/L (ref 3.4–5.1)
RBC # BLD: 3.43 MIL/MCL (ref 4.5–5.9)
SODIUM SERPL-SCNC: 143 MMOL/L (ref 135–145)
WBC # BLD: 6.5 K/MCL (ref 4.2–11)

## 2022-03-18 PROCEDURE — G0378 HOSPITAL OBSERVATION PER HR: HCPCS

## 2022-03-18 PROCEDURE — 85025 COMPLETE CBC W/AUTO DIFF WBC: CPT | Performed by: INTERNAL MEDICINE

## 2022-03-18 PROCEDURE — 80048 BASIC METABOLIC PNL TOTAL CA: CPT | Performed by: INTERNAL MEDICINE

## 2022-03-18 PROCEDURE — 13003289 HB OXYGEN THERAPY DAILY

## 2022-03-18 PROCEDURE — 82962 GLUCOSE BLOOD TEST: CPT

## 2022-03-18 PROCEDURE — 10002803 HB RX 637: Performed by: INTERNAL MEDICINE

## 2022-03-18 PROCEDURE — 10002801 HB RX 250 W/O HCPCS: Performed by: INTERNAL MEDICINE

## 2022-03-18 PROCEDURE — 36415 COLL VENOUS BLD VENIPUNCTURE: CPT | Performed by: INTERNAL MEDICINE

## 2022-03-18 RX ADMIN — SENNOSIDES 17.2 MG: 8.6 TABLET ORAL at 08:26

## 2022-03-18 RX ADMIN — MYCOPHENOLATE MOFETIL 500 MG: 200 POWDER, FOR SUSPENSION ORAL at 09:41

## 2022-03-18 RX ADMIN — DESMOPRESSIN ACETATE 40 MG: 0.2 TABLET ORAL at 08:26

## 2022-03-18 RX ADMIN — AMLODIPINE BESYLATE 10 MG: 10 TABLET ORAL at 08:28

## 2022-03-18 RX ADMIN — APIXABAN 5 MG: 5 TABLET, FILM COATED ORAL at 08:25

## 2022-03-18 RX ADMIN — GABAPENTIN 300 MG: 300 CAPSULE ORAL at 08:26

## 2022-03-18 RX ADMIN — LEVETIRACETAM 2000 MG: 500 TABLET, FILM COATED ORAL at 08:26

## 2022-03-18 RX ADMIN — LORAZEPAM 1 MG: 1 TABLET ORAL at 08:27

## 2022-03-18 RX ADMIN — TACROLIMUS 3 MG: 5 CAPSULE ORAL at 08:24

## 2022-03-18 RX ADMIN — PREDNISONE 2.5 MG: 1 TABLET ORAL at 08:25

## 2022-03-18 RX ADMIN — PANTOPRAZOLE 40 MG: 40 TABLET, DELAYED RELEASE ORAL at 08:24

## 2022-03-18 RX ADMIN — CARVEDILOL 6.25 MG: 6.25 TABLET, FILM COATED ORAL at 08:28

## 2022-03-18 RX ADMIN — GABAPENTIN 300 MG: 300 CAPSULE ORAL at 13:00

## 2022-03-18 RX ADMIN — TRAMADOL HYDROCHLORIDE 50 MG: 50 TABLET ORAL at 08:28

## 2022-03-18 RX ADMIN — LEVOTHYROXINE SODIUM 175 MCG: 100 TABLET ORAL at 06:26

## 2022-03-18 RX ADMIN — URSODIOL 300 MG: 300 CAPSULE ORAL at 08:26

## 2022-03-18 ASSESSMENT — PAIN SCALES - GENERAL: PAINLEVEL_OUTOF10: 0

## 2022-03-18 ASSESSMENT — PAIN SCALES - WONG BAKER: WONGBAKER_NUMERICALRESPONSE: 0

## 2022-03-29 ENCOUNTER — APPOINTMENT (OUTPATIENT)
Dept: GENERAL RADIOLOGY | Age: 62
End: 2022-03-29
Attending: STUDENT IN AN ORGANIZED HEALTH CARE EDUCATION/TRAINING PROGRAM

## 2022-03-29 ENCOUNTER — HOSPITAL ENCOUNTER (OUTPATIENT)
Age: 62
Setting detail: OBSERVATION
Discharge: NURSING, REHAB OR LONG-TERM CARE | End: 2022-04-01
Attending: EMERGENCY MEDICINE | Admitting: INTERNAL MEDICINE

## 2022-03-29 ENCOUNTER — APPOINTMENT (OUTPATIENT)
Dept: GENERAL RADIOLOGY | Age: 62
End: 2022-03-29
Attending: EMERGENCY MEDICINE

## 2022-03-29 DIAGNOSIS — Z78.9 ENCOUNTER FOR GASTROJEJUNAL (GJ) TUBE PLACEMENT: Primary | ICD-10-CM

## 2022-03-29 LAB
ALBUMIN SERPL-MCNC: 3 G/DL (ref 3.6–5.1)
ALBUMIN/GLOB SERPL: 0.5 {RATIO} (ref 1–2.4)
ALP SERPL-CCNC: 133 UNITS/L (ref 45–117)
ALT SERPL-CCNC: 24 UNITS/L
ANION GAP SERPL CALC-SCNC: 9 MMOL/L (ref 10–20)
AST SERPL-CCNC: 16 UNITS/L
BASOPHILS # BLD: 0 K/MCL (ref 0–0.3)
BASOPHILS NFR BLD: 0 %
BILIRUB SERPL-MCNC: 0.2 MG/DL (ref 0.2–1)
BUN SERPL-MCNC: 24 MG/DL (ref 6–20)
BUN/CREAT SERPL: 32 (ref 7–25)
CALCIUM SERPL-MCNC: 9.7 MG/DL (ref 8.4–10.2)
CHLORIDE SERPL-SCNC: 105 MMOL/L (ref 98–107)
CO2 SERPL-SCNC: 30 MMOL/L (ref 21–32)
CREAT SERPL-MCNC: 0.76 MG/DL (ref 0.67–1.17)
DEPRECATED RDW RBC: 45.2 FL (ref 39–50)
EOSINOPHIL # BLD: 0.3 K/MCL (ref 0–0.5)
EOSINOPHIL NFR BLD: 5 %
ERYTHROCYTE [DISTWIDTH] IN BLOOD: 13.3 % (ref 11–15)
FASTING DURATION TIME PATIENT: ABNORMAL H
FLUAV RNA RESP QL NAA+PROBE: NOT DETECTED
FLUBV RNA RESP QL NAA+PROBE: NOT DETECTED
GFR SERPLBLD BASED ON 1.73 SQ M-ARVRAT: >90 ML/MIN
GLOBULIN SER-MCNC: 5.7 G/DL (ref 2–4)
GLUCOSE SERPL-MCNC: 112 MG/DL (ref 70–99)
HCT VFR BLD CALC: 36.8 % (ref 39–51)
HGB BLD-MCNC: 11.2 G/DL (ref 13–17)
IMM GRANULOCYTES # BLD AUTO: 0 K/MCL (ref 0–0.2)
IMM GRANULOCYTES # BLD: 0 %
LYMPHOCYTES # BLD: 1.2 K/MCL (ref 1–4)
LYMPHOCYTES NFR BLD: 20 %
MCH RBC QN AUTO: 28.3 PG (ref 26–34)
MCHC RBC AUTO-ENTMCNC: 30.4 G/DL (ref 32–36.5)
MCV RBC AUTO: 92.9 FL (ref 78–100)
MONOCYTES # BLD: 0.6 K/MCL (ref 0.3–0.9)
MONOCYTES NFR BLD: 9 %
NEUTROPHILS # BLD: 4.1 K/MCL (ref 1.8–7.7)
NEUTROPHILS NFR BLD: 66 %
NRBC BLD MANUAL-RTO: 0 /100 WBC
PLATELET # BLD AUTO: 201 K/MCL (ref 140–450)
POTASSIUM SERPL-SCNC: 4.8 MMOL/L (ref 3.4–5.1)
PROCALCITONIN SERPL IA-MCNC: 0.06 NG/ML
PROT SERPL-MCNC: 8.7 G/DL (ref 6.4–8.2)
RBC # BLD: 3.96 MIL/MCL (ref 4.5–5.9)
RSV AG NPH QL IA.RAPID: NOT DETECTED
SARS-COV-2 RNA RESP QL NAA+PROBE: NOT DETECTED
SERVICE CMNT-IMP: NORMAL
SERVICE CMNT-IMP: NORMAL
SODIUM SERPL-SCNC: 139 MMOL/L (ref 135–145)
WBC # BLD: 6.2 K/MCL (ref 4.2–11)

## 2022-03-29 PROCEDURE — 43762 RPLC GTUBE NO REVJ TRC: CPT | Performed by: STUDENT IN AN ORGANIZED HEALTH CARE EDUCATION/TRAINING PROGRAM

## 2022-03-29 PROCEDURE — C9803 HOPD COVID-19 SPEC COLLECT: HCPCS

## 2022-03-29 PROCEDURE — 84145 PROCALCITONIN (PCT): CPT | Performed by: EMERGENCY MEDICINE

## 2022-03-29 PROCEDURE — 80053 COMPREHEN METABOLIC PANEL: CPT | Performed by: STUDENT IN AN ORGANIZED HEALTH CARE EDUCATION/TRAINING PROGRAM

## 2022-03-29 PROCEDURE — 36415 COLL VENOUS BLD VENIPUNCTURE: CPT

## 2022-03-29 PROCEDURE — 99284 EMERGENCY DEPT VISIT MOD MDM: CPT | Performed by: STUDENT IN AN ORGANIZED HEALTH CARE EDUCATION/TRAINING PROGRAM

## 2022-03-29 PROCEDURE — 43762 RPLC GTUBE NO REVJ TRC: CPT

## 2022-03-29 PROCEDURE — 0241U COVID/FLU/RSV PANEL: CPT | Performed by: STUDENT IN AN ORGANIZED HEALTH CARE EDUCATION/TRAINING PROGRAM

## 2022-03-29 PROCEDURE — 96361 HYDRATE IV INFUSION ADD-ON: CPT

## 2022-03-29 PROCEDURE — 74018 RADEX ABDOMEN 1 VIEW: CPT

## 2022-03-29 PROCEDURE — 85025 COMPLETE CBC W/AUTO DIFF WBC: CPT | Performed by: STUDENT IN AN ORGANIZED HEALTH CARE EDUCATION/TRAINING PROGRAM

## 2022-03-29 PROCEDURE — 10002807 HB RX 258: Performed by: STUDENT IN AN ORGANIZED HEALTH CARE EDUCATION/TRAINING PROGRAM

## 2022-03-29 PROCEDURE — 71045 X-RAY EXAM CHEST 1 VIEW: CPT

## 2022-03-29 PROCEDURE — G0378 HOSPITAL OBSERVATION PER HR: HCPCS

## 2022-03-29 PROCEDURE — 99284 EMERGENCY DEPT VISIT MOD MDM: CPT

## 2022-03-29 RX ORDER — LORAZEPAM 2 MG/ML
1 INJECTION INTRAMUSCULAR ONCE
Status: DISCONTINUED | OUTPATIENT
Start: 2022-03-29 | End: 2022-03-30 | Stop reason: ALTCHOICE

## 2022-03-29 RX ADMIN — SODIUM CHLORIDE, POTASSIUM CHLORIDE, SODIUM LACTATE AND CALCIUM CHLORIDE 1000 ML: 600; 310; 30; 20 INJECTION, SOLUTION INTRAVENOUS at 21:29

## 2022-03-30 ENCOUNTER — APPOINTMENT (OUTPATIENT)
Dept: INTERVENTIONAL RADIOLOGY/VASCULAR | Age: 62
End: 2022-03-30
Attending: NURSE PRACTITIONER

## 2022-03-30 ENCOUNTER — APPOINTMENT (OUTPATIENT)
Dept: GENERAL RADIOLOGY | Age: 62
End: 2022-03-30
Attending: INTERNAL MEDICINE

## 2022-03-30 LAB
BASOPHILS # BLD: 0 K/MCL (ref 0–0.3)
BASOPHILS NFR BLD: 0 %
DEPRECATED RDW RBC: 45.7 FL (ref 39–50)
EOSINOPHIL # BLD: 0.4 K/MCL (ref 0–0.5)
EOSINOPHIL NFR BLD: 6 %
ERYTHROCYTE [DISTWIDTH] IN BLOOD: 13.4 % (ref 11–15)
GLUCOSE BLDC GLUCOMTR-MCNC: 104 MG/DL (ref 70–99)
GLUCOSE BLDC GLUCOMTR-MCNC: 104 MG/DL (ref 70–99)
GLUCOSE BLDC GLUCOMTR-MCNC: 121 MG/DL (ref 70–99)
GLUCOSE BLDC GLUCOMTR-MCNC: 96 MG/DL (ref 70–99)
HCT VFR BLD CALC: 31.6 % (ref 39–51)
HGB BLD-MCNC: 9.9 G/DL (ref 13–17)
IMM GRANULOCYTES # BLD AUTO: 0 K/MCL (ref 0–0.2)
IMM GRANULOCYTES # BLD: 0 %
INR PPP: 1
LYMPHOCYTES # BLD: 1 K/MCL (ref 1–4)
LYMPHOCYTES NFR BLD: 16 %
MCH RBC QN AUTO: 29.2 PG (ref 26–34)
MCHC RBC AUTO-ENTMCNC: 31.3 G/DL (ref 32–36.5)
MCV RBC AUTO: 93.2 FL (ref 78–100)
MONOCYTES # BLD: 0.5 K/MCL (ref 0.3–0.9)
MONOCYTES NFR BLD: 8 %
NEUTROPHILS # BLD: 4.4 K/MCL (ref 1.8–7.7)
NEUTROPHILS NFR BLD: 70 %
NRBC BLD MANUAL-RTO: 0 /100 WBC
PLATELET # BLD AUTO: 215 K/MCL (ref 140–450)
PROTHROMBIN TIME: 10.8 SEC (ref 9.7–11.8)
RBC # BLD: 3.39 MIL/MCL (ref 4.5–5.9)
TSH SERPL-ACNC: 1.35 MCUNITS/ML (ref 0.35–5)
WBC # BLD: 6.2 K/MCL (ref 4.2–11)

## 2022-03-30 PROCEDURE — 10002801 HB RX 250 W/O HCPCS: Performed by: NURSE PRACTITIONER

## 2022-03-30 PROCEDURE — 13003289 HB OXYGEN THERAPY DAILY

## 2022-03-30 PROCEDURE — 49452 REPLACE G-J TUBE PERC: CPT

## 2022-03-30 PROCEDURE — 10002805 HB CONTRAST AGENT: Performed by: INTERNAL MEDICINE

## 2022-03-30 PROCEDURE — 85610 PROTHROMBIN TIME: CPT | Performed by: NURSE PRACTITIONER

## 2022-03-30 PROCEDURE — 49446 CHANGE G-TUBE TO G-J PERC: CPT

## 2022-03-30 PROCEDURE — 10002803 HB RX 637: Performed by: NURSE PRACTITIONER

## 2022-03-30 PROCEDURE — 96376 TX/PRO/DX INJ SAME DRUG ADON: CPT

## 2022-03-30 PROCEDURE — C1769 GUIDE WIRE: HCPCS

## 2022-03-30 PROCEDURE — G0378 HOSPITAL OBSERVATION PER HR: HCPCS

## 2022-03-30 PROCEDURE — 96374 THER/PROPH/DIAG INJ IV PUSH: CPT

## 2022-03-30 PROCEDURE — C1887 CATHETER, GUIDING: HCPCS

## 2022-03-30 PROCEDURE — 82962 GLUCOSE BLOOD TEST: CPT

## 2022-03-30 PROCEDURE — 85025 COMPLETE CBC W/AUTO DIFF WBC: CPT | Performed by: NURSE PRACTITIONER

## 2022-03-30 PROCEDURE — 10002807 HB RX 258: Performed by: NURSE PRACTITIONER

## 2022-03-30 PROCEDURE — 84443 ASSAY THYROID STIM HORMONE: CPT | Performed by: NURSE PRACTITIONER

## 2022-03-30 PROCEDURE — 36415 COLL VENOUS BLD VENIPUNCTURE: CPT | Performed by: NURSE PRACTITIONER

## 2022-03-30 PROCEDURE — 10004651 HB RX, NO CHARGE ITEM: Performed by: NURSE PRACTITIONER

## 2022-03-30 PROCEDURE — 74018 RADEX ABDOMEN 1 VIEW: CPT

## 2022-03-30 PROCEDURE — 10002805 HB CONTRAST AGENT: Performed by: NURSE PRACTITIONER

## 2022-03-30 PROCEDURE — 10002800 HB RX 250 W HCPCS: Performed by: INTERNAL MEDICINE

## 2022-03-30 PROCEDURE — 10006023 HB SUPPLY 272

## 2022-03-30 RX ORDER — DEXTROSE AND SODIUM CHLORIDE 5; .9 G/100ML; G/100ML
INJECTION, SOLUTION INTRAVENOUS CONTINUOUS
Status: DISCONTINUED | OUTPATIENT
Start: 2022-03-30 | End: 2022-03-31 | Stop reason: CLARIF

## 2022-03-30 RX ORDER — DEXTROSE MONOHYDRATE 25 G/50ML
12.5 INJECTION, SOLUTION INTRAVENOUS PRN
Status: DISCONTINUED | OUTPATIENT
Start: 2022-03-30 | End: 2022-04-01 | Stop reason: HOSPADM

## 2022-03-30 RX ORDER — LORAZEPAM 2 MG/ML
0.5 INJECTION INTRAMUSCULAR EVERY 6 HOURS PRN
Status: DISCONTINUED | OUTPATIENT
Start: 2022-03-30 | End: 2022-04-01 | Stop reason: HOSPADM

## 2022-03-30 RX ORDER — LIDOCAINE 4 G/G
2 PATCH TOPICAL DAILY
Status: DISCONTINUED | OUTPATIENT
Start: 2022-03-30 | End: 2022-04-01 | Stop reason: HOSPADM

## 2022-03-30 RX ORDER — LORAZEPAM 0.5 MG/1
0.5 TABLET ORAL EVERY 6 HOURS PRN
Status: DISCONTINUED | OUTPATIENT
Start: 2022-03-30 | End: 2022-03-30 | Stop reason: ALTCHOICE

## 2022-03-30 RX ORDER — DEXTROSE MONOHYDRATE 25 G/50ML
25 INJECTION, SOLUTION INTRAVENOUS PRN
Status: DISCONTINUED | OUTPATIENT
Start: 2022-03-30 | End: 2022-04-01 | Stop reason: HOSPADM

## 2022-03-30 RX ORDER — ACETAMINOPHEN 650 MG/1
650 SUPPOSITORY RECTAL EVERY 4 HOURS PRN
Status: DISCONTINUED | OUTPATIENT
Start: 2022-03-30 | End: 2022-04-01 | Stop reason: HOSPADM

## 2022-03-30 RX ORDER — NICOTINE POLACRILEX 4 MG
30 LOZENGE BUCCAL PRN
Status: DISCONTINUED | OUTPATIENT
Start: 2022-03-30 | End: 2022-04-01 | Stop reason: HOSPADM

## 2022-03-30 RX ORDER — NICOTINE POLACRILEX 4 MG
15 LOZENGE BUCCAL PRN
Status: DISCONTINUED | OUTPATIENT
Start: 2022-03-30 | End: 2022-04-01 | Stop reason: HOSPADM

## 2022-03-30 RX ORDER — 0.9 % SODIUM CHLORIDE 0.9 %
2 VIAL (ML) INJECTION EVERY 12 HOURS SCHEDULED
Status: DISCONTINUED | OUTPATIENT
Start: 2022-03-30 | End: 2022-04-01 | Stop reason: HOSPADM

## 2022-03-30 RX ADMIN — DEXTROSE AND SODIUM CHLORIDE: 5; 900 INJECTION, SOLUTION INTRAVENOUS at 01:35

## 2022-03-30 RX ADMIN — LORAZEPAM 0.5 MG: 2 INJECTION INTRAMUSCULAR; INTRAVENOUS at 23:23

## 2022-03-30 RX ADMIN — LIDOCAINE 2 PATCH: 4 PATCH TOPICAL at 08:52

## 2022-03-30 RX ADMIN — LORAZEPAM 0.5 MG: 2 INJECTION INTRAMUSCULAR; INTRAVENOUS at 01:37

## 2022-03-30 RX ADMIN — DEXTROSE AND SODIUM CHLORIDE: 5; 900 INJECTION, SOLUTION INTRAVENOUS at 23:23

## 2022-03-30 RX ADMIN — IOHEXOL 25 ML: 300 INJECTION, SOLUTION INTRAVENOUS at 23:40

## 2022-03-30 RX ADMIN — SODIUM CHLORIDE, PRESERVATIVE FREE 2 ML: 5 INJECTION INTRAVENOUS at 20:28

## 2022-03-30 RX ADMIN — LIDOCAINE HYDROCHLORIDE: 10 INJECTION, SOLUTION EPIDURAL; INFILTRATION; INTRACAUDAL; PERINEURAL at 15:48

## 2022-03-30 RX ADMIN — IOHEXOL 45 ML: 300 INJECTION, SOLUTION INTRAVENOUS at 15:47

## 2022-03-30 ASSESSMENT — ENCOUNTER SYMPTOMS
HEADACHES: 0
CHILLS: 0
COUGH: 0
EYE PAIN: 0
FEVER: 0
BACK PAIN: 0
DIZZINESS: 0
DIARRHEA: 0
ABDOMINAL PAIN: 0
NAUSEA: 0
VOMITING: 0
SHORTNESS OF BREATH: 0

## 2022-03-30 ASSESSMENT — PAIN SCALES - PAIN ASSESSMENT IN ADVANCED DEMENTIA (PAINAD)
CONSOLABILITY: NO NEED TO CONSOLE
BODYLANGUAGE: RELAXED
TOTALSCORE: 0
FACIALEXPRESSION: SMILING OR INEXPRESSIVE
BREATHING: NORMAL

## 2022-03-30 ASSESSMENT — ACTIVITIES OF DAILY LIVING (ADL)
RECENT_DECLINE_ADL: NO
CHRONIC_PAIN_PRESENT: NO
BATHING: DEPENDENT
DRESSING YOURSELF: DEPENDENT
ADL_SHORT_OF_BREATH: NO
FEEDING YOURSELF: DEPENDENT
ADL_SCORE: 0
ADL_BEFORE_ADMISSION: NEEDS/REQUIRES ASSISTANCE
TRANSFERRING: DEPENDENT
TOILETING: DEPENDENT
CONTINENCE: DEPENDENT
MOBILITY_ASSIST_DEVICES: TOTAL LIFT

## 2022-03-30 ASSESSMENT — LIFESTYLE VARIABLES
HOW OFTEN DO YOU HAVE 6 OR MORE DRINKS ON ONE OCCASION: NEVER
HOW OFTEN DO YOU HAVE A DRINK CONTAINING ALCOHOL: NEVER
HOW MANY STANDARD DRINKS CONTAINING ALCOHOL DO YOU HAVE ON A TYPICAL DAY: 0,1 OR 2
AUDIT-C TOTAL SCORE: 0
ALCOHOL_USE_STATUS: NO OR LOW RISK WITH VALIDATED TOOL

## 2022-03-30 ASSESSMENT — COGNITIVE AND FUNCTIONAL STATUS - GENERAL
BECAUSE OF A PHYSICAL, MENTAL, OR EMOTIONAL CONDITION, DO YOU HAVE SERIOUS DIFFICULTY CONCENTRATING, REMEMBERING OR MAKING DECISIONS: YES
BECAUSE OF A PHYSICAL, MENTAL, OR EMOTIONAL CONDITION, DO YOU HAVE DIFFICULTY DOING ERRANDS ALONE: YES
DO YOU HAVE DIFFICULTY DRESSING OR BATHING: YES
ARE YOU DEAF OR DO YOU HAVE SERIOUS DIFFICULTY  HEARING: NO
ARE YOU BLIND OR DO YOU HAVE SERIOUS DIFFICULTY SEEING, EVEN WHEN WEARING GLASSES: NO
DO YOU HAVE SERIOUS DIFFICULTY WALKING OR CLIMBING STAIRS: YES

## 2022-03-30 ASSESSMENT — PAIN SCALES - GENERAL
PAINLEVEL_OUTOF10: 0
PAINLEVEL_OUTOF10: 10

## 2022-03-30 ASSESSMENT — PATIENT HEALTH QUESTIONNAIRE - PHQ9: IS PATIENT ABLE TO COMPLETE PHQ2 OR PHQ9: NO, DEFER TO LATER TIME

## 2022-03-30 ASSESSMENT — COLUMBIA-SUICIDE SEVERITY RATING SCALE - C-SSRS: IS THE PATIENT ABLE TO COMPLETE C-SSRS: NO, DEFER TO LATER TIME

## 2022-03-31 LAB
GLUCOSE BLDC GLUCOMTR-MCNC: 126 MG/DL (ref 70–99)
GLUCOSE BLDC GLUCOMTR-MCNC: 132 MG/DL (ref 70–99)
GLUCOSE BLDC GLUCOMTR-MCNC: 149 MG/DL (ref 70–99)
GLUCOSE BLDC GLUCOMTR-MCNC: 98 MG/DL (ref 70–99)

## 2022-03-31 PROCEDURE — 10002801 HB RX 250 W/O HCPCS: Performed by: INTERNAL MEDICINE

## 2022-03-31 PROCEDURE — 10004281 HB COUNTER-STAFF TIME PER 15 MIN

## 2022-03-31 PROCEDURE — G0378 HOSPITAL OBSERVATION PER HR: HCPCS

## 2022-03-31 PROCEDURE — 10004651 HB RX, NO CHARGE ITEM: Performed by: NURSE PRACTITIONER

## 2022-03-31 PROCEDURE — 10002803 HB RX 637: Performed by: INTERNAL MEDICINE

## 2022-03-31 PROCEDURE — 10002803 HB RX 637: Performed by: NURSE PRACTITIONER

## 2022-03-31 PROCEDURE — 82962 GLUCOSE BLOOD TEST: CPT

## 2022-03-31 PROCEDURE — 13003292 HB HHF OXYGEN THERAPY DAILY

## 2022-03-31 PROCEDURE — 51798 US URINE CAPACITY MEASURE: CPT

## 2022-03-31 RX ORDER — AMLODIPINE BESYLATE 10 MG/1
10 TABLET ORAL DAILY
Status: DISCONTINUED | OUTPATIENT
Start: 2022-03-31 | End: 2022-04-01 | Stop reason: HOSPADM

## 2022-03-31 RX ORDER — TRAMADOL HYDROCHLORIDE 50 MG/1
50 TABLET ORAL EVERY 12 HOURS PRN
Status: DISCONTINUED | OUTPATIENT
Start: 2022-03-31 | End: 2022-04-01 | Stop reason: HOSPADM

## 2022-03-31 RX ORDER — GABAPENTIN 300 MG/1
300 CAPSULE ORAL 4 TIMES DAILY
Status: DISCONTINUED | OUTPATIENT
Start: 2022-03-31 | End: 2022-04-01 | Stop reason: HOSPADM

## 2022-03-31 RX ORDER — ATORVASTATIN CALCIUM 40 MG/1
40 TABLET, FILM COATED ORAL AT BEDTIME
Status: DISCONTINUED | OUTPATIENT
Start: 2022-03-31 | End: 2022-04-01 | Stop reason: HOSPADM

## 2022-03-31 RX ORDER — SENNOSIDES A AND B 8.6 MG/1
2 TABLET, FILM COATED ORAL DAILY
Status: DISCONTINUED | OUTPATIENT
Start: 2022-03-31 | End: 2022-04-01 | Stop reason: HOSPADM

## 2022-03-31 RX ORDER — MYCOPHENOLATE MOFETIL 200 MG/ML
500 POWDER, FOR SUSPENSION ORAL
Status: DISCONTINUED | OUTPATIENT
Start: 2022-03-31 | End: 2022-04-01 | Stop reason: HOSPADM

## 2022-03-31 RX ORDER — PREDNISONE 5 MG/1
2.5 TABLET ORAL 2 TIMES DAILY
Status: DISCONTINUED | OUTPATIENT
Start: 2022-03-31 | End: 2022-04-01 | Stop reason: HOSPADM

## 2022-03-31 RX ORDER — CARVEDILOL 3.12 MG/1
6.25 TABLET ORAL 2 TIMES DAILY WITH MEALS
Status: DISCONTINUED | OUTPATIENT
Start: 2022-03-31 | End: 2022-04-01 | Stop reason: HOSPADM

## 2022-03-31 RX ORDER — LORAZEPAM 1 MG/1
1 TABLET ORAL EVERY 12 HOURS PRN
Status: DISCONTINUED | OUTPATIENT
Start: 2022-03-31 | End: 2022-04-01 | Stop reason: HOSPADM

## 2022-03-31 RX ORDER — LEVETIRACETAM 500 MG/1
2000 TABLET ORAL 2 TIMES DAILY
Status: DISCONTINUED | OUTPATIENT
Start: 2022-03-31 | End: 2022-04-01 | Stop reason: HOSPADM

## 2022-03-31 RX ORDER — DOXAZOSIN MESYLATE 1 MG/1
2 TABLET ORAL NIGHTLY
Status: DISCONTINUED | OUTPATIENT
Start: 2022-03-31 | End: 2022-04-01 | Stop reason: HOSPADM

## 2022-03-31 RX ORDER — URSODIOL 300 MG/1
300 CAPSULE ORAL 2 TIMES DAILY
Status: DISCONTINUED | OUTPATIENT
Start: 2022-03-31 | End: 2022-04-01 | Stop reason: HOSPADM

## 2022-03-31 RX ORDER — TACROLIMUS 1 MG/1
3 CAPSULE ORAL 2 TIMES DAILY
Status: DISCONTINUED | OUTPATIENT
Start: 2022-03-31 | End: 2022-03-31 | Stop reason: SDUPTHER

## 2022-03-31 RX ORDER — MIRTAZAPINE 7.5 MG/1
30 TABLET, FILM COATED ORAL NIGHTLY
Status: DISCONTINUED | OUTPATIENT
Start: 2022-03-31 | End: 2022-04-01 | Stop reason: HOSPADM

## 2022-03-31 RX ADMIN — LIDOCAINE 2 PATCH: 4 PATCH TOPICAL at 10:03

## 2022-03-31 RX ADMIN — GABAPENTIN 300 MG: 300 CAPSULE ORAL at 14:16

## 2022-03-31 RX ADMIN — GABAPENTIN 300 MG: 300 CAPSULE ORAL at 18:13

## 2022-03-31 RX ADMIN — DOXAZOSIN 2 MG: 1 TABLET ORAL at 20:52

## 2022-03-31 RX ADMIN — AMLODIPINE BESYLATE 10 MG: 10 TABLET ORAL at 11:15

## 2022-03-31 RX ADMIN — TACROLIMUS 3 MG: 5 CAPSULE ORAL at 20:48

## 2022-03-31 RX ADMIN — PANTOPRAZOLE 40 MG: 40 TABLET, DELAYED RELEASE ORAL at 22:15

## 2022-03-31 RX ADMIN — CARVEDILOL 6.25 MG: 3.12 TABLET, FILM COATED ORAL at 11:15

## 2022-03-31 RX ADMIN — LEVOTHYROXINE SODIUM 175 MCG: 0.03 TABLET ORAL at 11:20

## 2022-03-31 RX ADMIN — MIRTAZAPINE 30 MG: 15 TABLET, FILM COATED ORAL at 20:51

## 2022-03-31 RX ADMIN — CARVEDILOL 6.25 MG: 3.12 TABLET, FILM COATED ORAL at 18:13

## 2022-03-31 RX ADMIN — URSODIOL 300 MG: 300 CAPSULE ORAL at 11:21

## 2022-03-31 RX ADMIN — ACETAMINOPHEN 650 MG: 650 SUPPOSITORY RECTAL at 03:07

## 2022-03-31 RX ADMIN — GABAPENTIN 300 MG: 300 CAPSULE ORAL at 22:15

## 2022-03-31 RX ADMIN — DESMOPRESSIN ACETATE 40 MG: 0.2 TABLET ORAL at 20:51

## 2022-03-31 RX ADMIN — URSODIOL 300 MG: 300 CAPSULE ORAL at 20:58

## 2022-03-31 RX ADMIN — PREDNISONE 2.5 MG: 5 TABLET ORAL at 11:16

## 2022-03-31 RX ADMIN — MYCOPHENOLATE MOFETIL 500 MG: 200 POWDER, FOR SUSPENSION ORAL at 20:48

## 2022-03-31 RX ADMIN — SODIUM CHLORIDE, PRESERVATIVE FREE 2 ML: 5 INJECTION INTRAVENOUS at 20:58

## 2022-03-31 RX ADMIN — APIXABAN 5 MG: 5 TABLET, FILM COATED ORAL at 10:58

## 2022-03-31 RX ADMIN — LEVETIRACETAM 2000 MG: 500 TABLET, FILM COATED ORAL at 10:59

## 2022-03-31 RX ADMIN — LEVETIRACETAM 2000 MG: 500 TABLET, FILM COATED ORAL at 20:51

## 2022-03-31 RX ADMIN — TRAMADOL HYDROCHLORIDE 50 MG: 50 TABLET ORAL at 11:33

## 2022-03-31 RX ADMIN — PREDNISONE 2.5 MG: 5 TABLET ORAL at 20:52

## 2022-03-31 RX ADMIN — TACROLIMUS 3 MG: 5 CAPSULE ORAL at 14:16

## 2022-03-31 RX ADMIN — MYCOPHENOLATE MOFETIL 500 MG: 200 POWDER, FOR SUSPENSION ORAL at 14:16

## 2022-03-31 RX ADMIN — APIXABAN 5 MG: 5 TABLET, FILM COATED ORAL at 20:51

## 2022-03-31 RX ADMIN — SENNOSIDES 17.2 MG: 8.6 TABLET ORAL at 10:58

## 2022-03-31 ASSESSMENT — PAIN SCALES - PAIN ASSESSMENT IN ADVANCED DEMENTIA (PAINAD)
CONSOLABILITY: UNABLE TO CONSOLE, DISTRACT OR REASSURE
BODYLANGUAGE: TENSE, DISTRESSED, FIDGETING
CONSOLABILITY: NO NEED TO CONSOLE
NEGVOCALIZATION: OCCASIONAL MOAN OR GROAN, LOW LEVELS OF SPEECH WITH A NEGATIVE OR DISAPPROVING QUALITY
BODYLANGUAGE: RELAXED
BREATHING: NORMAL
BODYLANGUAGE: RELAXED
BREATHING: NORMAL
TOTALSCORE: 0
TOTALSCORE: 1
BREATHING: NORMAL
FACIALEXPRESSION: SAD. FRIGHTENED. FROWNING
CONSOLABILITY: NO NEED TO CONSOLE
BREATHING: NORMAL
TOTALSCORE: 6
TOTALSCORE: 0
NEGVOCALIZATION: REPEATED TROUBLED CALLING OUT. LOUD MOANING OR GROANING. CRYING
BODYLANGUAGE: RELAXED
CONSOLABILITY: NO NEED TO CONSOLE
FACIALEXPRESSION: SMILING OR INEXPRESSIVE
TOTALSCORE: 3
BODYLANGUAGE: TENSE, DISTRESSED, FIDGETING
FACIALEXPRESSION: SAD. FRIGHTENED. FROWNING
FACIALEXPRESSION: SAD. FRIGHTENED. FROWNING
FACIALEXPRESSION: SMILING OR INEXPRESSIVE
CONSOLABILITY: NO NEED TO CONSOLE
BREATHING: NORMAL

## 2022-03-31 ASSESSMENT — PAIN SCALES - GENERAL
PAINLEVEL_OUTOF10: 10
PAINLEVEL_OUTOF10: 0

## 2022-03-31 ASSESSMENT — PAIN DESCRIPTION - PAIN TYPE
TYPE: CHRONIC PAIN
TYPE: CHRONIC PAIN

## 2022-04-01 VITALS
RESPIRATION RATE: 16 BRPM | HEART RATE: 75 BPM | HEIGHT: 70 IN | SYSTOLIC BLOOD PRESSURE: 135 MMHG | BODY MASS INDEX: 35.16 KG/M2 | DIASTOLIC BLOOD PRESSURE: 84 MMHG | OXYGEN SATURATION: 100 % | TEMPERATURE: 97.2 F | WEIGHT: 245.59 LBS

## 2022-04-01 LAB
GLUCOSE BLDC GLUCOMTR-MCNC: 126 MG/DL (ref 70–99)
GLUCOSE BLDC GLUCOMTR-MCNC: 136 MG/DL (ref 70–99)

## 2022-04-01 PROCEDURE — 10002801 HB RX 250 W/O HCPCS: Performed by: INTERNAL MEDICINE

## 2022-04-01 PROCEDURE — 10002803 HB RX 637: Performed by: NURSE PRACTITIONER

## 2022-04-01 PROCEDURE — 10004651 HB RX, NO CHARGE ITEM: Performed by: NURSE PRACTITIONER

## 2022-04-01 PROCEDURE — G0378 HOSPITAL OBSERVATION PER HR: HCPCS

## 2022-04-01 PROCEDURE — 10002803 HB RX 637: Performed by: INTERNAL MEDICINE

## 2022-04-01 PROCEDURE — 82962 GLUCOSE BLOOD TEST: CPT

## 2022-04-01 PROCEDURE — 13003289 HB OXYGEN THERAPY DAILY

## 2022-04-01 RX ADMIN — TACROLIMUS 3 MG: 5 CAPSULE ORAL at 09:42

## 2022-04-01 RX ADMIN — LIDOCAINE 2 PATCH: 4 PATCH TOPICAL at 09:42

## 2022-04-01 RX ADMIN — PREDNISONE 2.5 MG: 5 TABLET ORAL at 09:41

## 2022-04-01 RX ADMIN — APIXABAN 5 MG: 5 TABLET, FILM COATED ORAL at 09:41

## 2022-04-01 RX ADMIN — LEVOTHYROXINE SODIUM 175 MCG: 0.03 TABLET ORAL at 05:23

## 2022-04-01 RX ADMIN — TRAMADOL HYDROCHLORIDE 50 MG: 50 TABLET ORAL at 01:48

## 2022-04-01 RX ADMIN — AMLODIPINE BESYLATE 10 MG: 10 TABLET ORAL at 09:42

## 2022-04-01 RX ADMIN — GABAPENTIN 300 MG: 300 CAPSULE ORAL at 09:41

## 2022-04-01 RX ADMIN — LEVETIRACETAM 2000 MG: 500 TABLET, FILM COATED ORAL at 09:41

## 2022-04-01 RX ADMIN — SENNOSIDES 17.2 MG: 8.6 TABLET ORAL at 09:41

## 2022-04-01 RX ADMIN — SODIUM CHLORIDE, PRESERVATIVE FREE 2 ML: 5 INJECTION INTRAVENOUS at 09:48

## 2022-04-01 RX ADMIN — CARVEDILOL 6.25 MG: 3.12 TABLET, FILM COATED ORAL at 09:41

## 2022-04-01 RX ADMIN — URSODIOL 300 MG: 300 CAPSULE ORAL at 09:41

## 2022-04-01 ASSESSMENT — PAIN SCALES - PAIN ASSESSMENT IN ADVANCED DEMENTIA (PAINAD)
FACIALEXPRESSION: SAD. FRIGHTENED. FROWNING
BREATHING: OCCASIONAL LABORED BREATHING, SHORT PERIOD OF HYPERVENTILATION
BODYLANGUAGE: TENSE, DISTRESSED, FIDGETING
BREATHING: NORMAL
CONSOLABILITY: NO NEED TO CONSOLE
CONSOLABILITY: NO NEED TO CONSOLE
NEGVOCALIZATION: OCCASIONAL MOAN OR GROAN, LOW LEVELS OF SPEECH WITH A NEGATIVE OR DISAPPROVING QUALITY
TOTALSCORE: 0
BODYLANGUAGE: RELAXED
FACIALEXPRESSION: SMILING OR INEXPRESSIVE
TOTALSCORE: 4

## 2022-04-22 LAB
SARS-COV-2 RNA SPEC QL NAA+PROBE: NEGATIVE
SPECIMEN SOURCE: NORMAL

## 2022-04-23 ENCOUNTER — HOSPITAL ENCOUNTER (EMERGENCY)
Age: 62
Discharge: NURSING, REHAB OR LONG-TERM CARE | End: 2022-04-23
Attending: INTERNAL MEDICINE | Admitting: INTERNAL MEDICINE

## 2022-04-23 VITALS
DIASTOLIC BLOOD PRESSURE: 81 MMHG | SYSTOLIC BLOOD PRESSURE: 159 MMHG | OXYGEN SATURATION: 95 % | RESPIRATION RATE: 20 BRPM | HEART RATE: 70 BPM | TEMPERATURE: 96.8 F

## 2022-04-23 DIAGNOSIS — K94.23 GASTROSTOMY TUBE DYSFUNCTION (CMD): Primary | ICD-10-CM

## 2022-04-23 PROCEDURE — 99283 EMERGENCY DEPT VISIT LOW MDM: CPT | Performed by: EMERGENCY MEDICINE

## 2022-04-23 PROCEDURE — 99283 EMERGENCY DEPT VISIT LOW MDM: CPT

## 2022-04-25 ENCOUNTER — APPOINTMENT (OUTPATIENT)
Dept: INTERVENTIONAL RADIOLOGY/VASCULAR | Age: 62
End: 2022-04-25
Attending: STUDENT IN AN ORGANIZED HEALTH CARE EDUCATION/TRAINING PROGRAM

## 2022-04-25 ENCOUNTER — HOSPITAL ENCOUNTER (OUTPATIENT)
Age: 62
Setting detail: OBSERVATION
Discharge: NURSING, REHAB OR LONG-TERM CARE | End: 2022-04-28
Attending: INTERNAL MEDICINE | Admitting: INTERNAL MEDICINE

## 2022-04-25 DIAGNOSIS — Z78.9 ENCOUNTER FOR GASTROJEJUNAL (GJ) TUBE PLACEMENT: Primary | ICD-10-CM

## 2022-04-25 PROBLEM — K94.13 JEJUNOSTOMY TUBE LEAK (CMD): Status: ACTIVE | Noted: 2022-04-25

## 2022-04-25 LAB
ALBUMIN SERPL-MCNC: 3.1 G/DL (ref 3.6–5.1)
ALBUMIN/GLOB SERPL: 0.6 {RATIO} (ref 1–2.4)
ALP SERPL-CCNC: 122 UNITS/L (ref 45–117)
ALT SERPL-CCNC: 32 UNITS/L
ANION GAP SERPL CALC-SCNC: 7 MMOL/L (ref 10–20)
APTT PPP: 26 SEC (ref 22–30)
AST SERPL-CCNC: 20 UNITS/L
BASOPHILS # BLD: 0 K/MCL (ref 0–0.3)
BASOPHILS NFR BLD: 0 %
BILIRUB SERPL-MCNC: 0.4 MG/DL (ref 0.2–1)
BUN SERPL-MCNC: 42 MG/DL (ref 6–20)
BUN/CREAT SERPL: 37 (ref 7–25)
CALCIUM SERPL-MCNC: 9.5 MG/DL (ref 8.4–10.2)
CHLORIDE SERPL-SCNC: 108 MMOL/L (ref 98–107)
CO2 SERPL-SCNC: 32 MMOL/L (ref 21–32)
CREAT SERPL-MCNC: 1.13 MG/DL (ref 0.67–1.17)
DEPRECATED RDW RBC: 53.7 FL (ref 39–50)
EOSINOPHIL # BLD: 0.3 K/MCL (ref 0–0.5)
EOSINOPHIL NFR BLD: 5 %
ERYTHROCYTE [DISTWIDTH] IN BLOOD: 15.9 % (ref 11–15)
FASTING DURATION TIME PATIENT: ABNORMAL H
FLUAV RNA RESP QL NAA+PROBE: NOT DETECTED
FLUBV RNA RESP QL NAA+PROBE: NOT DETECTED
GFR SERPLBLD BASED ON 1.73 SQ M-ARVRAT: 73 ML/MIN
GLOBULIN SER-MCNC: 5.2 G/DL (ref 2–4)
GLUCOSE BLDC GLUCOMTR-MCNC: 107 MG/DL (ref 70–99)
GLUCOSE SERPL-MCNC: 123 MG/DL (ref 70–99)
HCT VFR BLD CALC: 34.7 % (ref 39–51)
HGB BLD-MCNC: 10.4 G/DL (ref 13–17)
IMM GRANULOCYTES # BLD AUTO: 0 K/MCL (ref 0–0.2)
IMM GRANULOCYTES # BLD: 0 %
INR PPP: 1
LYMPHOCYTES # BLD: 2.2 K/MCL (ref 1–4)
LYMPHOCYTES NFR BLD: 35 %
MCH RBC QN AUTO: 27.7 PG (ref 26–34)
MCHC RBC AUTO-ENTMCNC: 30 G/DL (ref 32–36.5)
MCV RBC AUTO: 92.5 FL (ref 78–100)
MONOCYTES # BLD: 0.5 K/MCL (ref 0.3–0.9)
MONOCYTES NFR BLD: 9 %
NEUTROPHILS # BLD: 3.1 K/MCL (ref 1.8–7.7)
NEUTROPHILS NFR BLD: 51 %
NRBC BLD MANUAL-RTO: 0 /100 WBC
PLATELET # BLD AUTO: 230 K/MCL (ref 140–450)
POTASSIUM SERPL-SCNC: 5 MMOL/L (ref 3.4–5.1)
PROT SERPL-MCNC: 8.3 G/DL (ref 6.4–8.2)
PROTHROMBIN TIME: 10.9 SEC (ref 9.7–11.8)
RBC # BLD: 3.75 MIL/MCL (ref 4.5–5.9)
RSV AG NPH QL IA.RAPID: NOT DETECTED
SARS-COV-2 RNA RESP QL NAA+PROBE: NOT DETECTED
SERVICE CMNT-IMP: NORMAL
SERVICE CMNT-IMP: NORMAL
SODIUM SERPL-SCNC: 142 MMOL/L (ref 135–145)
WBC # BLD: 6.1 K/MCL (ref 4.2–11)

## 2022-04-25 PROCEDURE — 99284 EMERGENCY DEPT VISIT MOD MDM: CPT | Performed by: STUDENT IN AN ORGANIZED HEALTH CARE EDUCATION/TRAINING PROGRAM

## 2022-04-25 PROCEDURE — 10002803 HB RX 637: Performed by: INTERNAL MEDICINE

## 2022-04-25 PROCEDURE — 10006023 HB SUPPLY 272

## 2022-04-25 PROCEDURE — C1887 CATHETER, GUIDING: HCPCS

## 2022-04-25 PROCEDURE — 10002800 HB RX 250 W HCPCS: Performed by: INTERNAL MEDICINE

## 2022-04-25 PROCEDURE — C1769 GUIDE WIRE: HCPCS

## 2022-04-25 PROCEDURE — 80053 COMPREHEN METABOLIC PANEL: CPT | Performed by: STUDENT IN AN ORGANIZED HEALTH CARE EDUCATION/TRAINING PROGRAM

## 2022-04-25 PROCEDURE — 85610 PROTHROMBIN TIME: CPT | Performed by: STUDENT IN AN ORGANIZED HEALTH CARE EDUCATION/TRAINING PROGRAM

## 2022-04-25 PROCEDURE — 0241U COVID/FLU/RSV PANEL: CPT | Performed by: EMERGENCY MEDICINE

## 2022-04-25 PROCEDURE — G0378 HOSPITAL OBSERVATION PER HR: HCPCS

## 2022-04-25 PROCEDURE — 10004651 HB RX, NO CHARGE ITEM: Performed by: INTERNAL MEDICINE

## 2022-04-25 PROCEDURE — 85730 THROMBOPLASTIN TIME PARTIAL: CPT | Performed by: STUDENT IN AN ORGANIZED HEALTH CARE EDUCATION/TRAINING PROGRAM

## 2022-04-25 PROCEDURE — 10002805 HB CONTRAST AGENT: Performed by: STUDENT IN AN ORGANIZED HEALTH CARE EDUCATION/TRAINING PROGRAM

## 2022-04-25 PROCEDURE — 99285 EMERGENCY DEPT VISIT HI MDM: CPT

## 2022-04-25 PROCEDURE — 49452 REPLACE G-J TUBE PERC: CPT

## 2022-04-25 PROCEDURE — 13003289 HB OXYGEN THERAPY DAILY

## 2022-04-25 PROCEDURE — 85025 COMPLETE CBC W/AUTO DIFF WBC: CPT | Performed by: STUDENT IN AN ORGANIZED HEALTH CARE EDUCATION/TRAINING PROGRAM

## 2022-04-25 PROCEDURE — 96372 THER/PROPH/DIAG INJ SC/IM: CPT

## 2022-04-25 PROCEDURE — 82962 GLUCOSE BLOOD TEST: CPT

## 2022-04-25 RX ORDER — DOXAZOSIN MESYLATE 1 MG/1
2 TABLET ORAL NIGHTLY
Status: DISCONTINUED | OUTPATIENT
Start: 2022-04-25 | End: 2022-04-28 | Stop reason: HOSPADM

## 2022-04-25 RX ORDER — TACROLIMUS 1 MG/1
3 CAPSULE ORAL 2 TIMES DAILY
Status: DISCONTINUED | OUTPATIENT
Start: 2022-04-25 | End: 2022-04-25

## 2022-04-25 RX ORDER — 0.9 % SODIUM CHLORIDE 0.9 %
2 VIAL (ML) INJECTION EVERY 12 HOURS SCHEDULED
Status: DISCONTINUED | OUTPATIENT
Start: 2022-04-25 | End: 2022-04-28 | Stop reason: HOSPADM

## 2022-04-25 RX ORDER — ONDANSETRON 2 MG/ML
4 INJECTION INTRAMUSCULAR; INTRAVENOUS 2 TIMES DAILY PRN
Status: DISCONTINUED | OUTPATIENT
Start: 2022-04-25 | End: 2022-04-28 | Stop reason: HOSPADM

## 2022-04-25 RX ORDER — MYCOPHENOLATE MOFETIL 200 MG/ML
500 POWDER, FOR SUSPENSION ORAL 2 TIMES DAILY
Status: DISCONTINUED | OUTPATIENT
Start: 2022-04-25 | End: 2022-04-28 | Stop reason: HOSPADM

## 2022-04-25 RX ORDER — ATORVASTATIN CALCIUM 40 MG/1
40 TABLET, FILM COATED ORAL AT BEDTIME
Status: DISCONTINUED | OUTPATIENT
Start: 2022-04-25 | End: 2022-04-28 | Stop reason: HOSPADM

## 2022-04-25 RX ORDER — POLYETHYLENE GLYCOL 3350 17 G/17G
17 POWDER, FOR SOLUTION ORAL DAILY
Status: DISCONTINUED | OUTPATIENT
Start: 2022-04-25 | End: 2022-04-28 | Stop reason: HOSPADM

## 2022-04-25 RX ORDER — LANOLIN ALCOHOL/MO/W.PET/CERES
400 CREAM (GRAM) TOPICAL DAILY
Status: DISCONTINUED | OUTPATIENT
Start: 2022-04-25 | End: 2022-04-28 | Stop reason: HOSPADM

## 2022-04-25 RX ORDER — GABAPENTIN 300 MG/1
300 CAPSULE ORAL 4 TIMES DAILY
Status: DISCONTINUED | OUTPATIENT
Start: 2022-04-25 | End: 2022-04-28 | Stop reason: HOSPADM

## 2022-04-25 RX ORDER — MIRTAZAPINE 15 MG/1
30 TABLET, FILM COATED ORAL NIGHTLY
Status: DISCONTINUED | OUTPATIENT
Start: 2022-04-25 | End: 2022-04-28 | Stop reason: HOSPADM

## 2022-04-25 RX ORDER — CARVEDILOL 6.25 MG/1
6.25 TABLET ORAL 2 TIMES DAILY WITH MEALS
Status: DISCONTINUED | OUTPATIENT
Start: 2022-04-25 | End: 2022-04-28 | Stop reason: HOSPADM

## 2022-04-25 RX ORDER — AMLODIPINE BESYLATE 10 MG/1
10 TABLET ORAL DAILY
Status: DISCONTINUED | OUTPATIENT
Start: 2022-04-25 | End: 2022-04-28 | Stop reason: HOSPADM

## 2022-04-25 RX ORDER — LEVETIRACETAM 100 MG/ML
2000 SOLUTION ORAL EVERY 12 HOURS SCHEDULED
Status: DISCONTINUED | OUTPATIENT
Start: 2022-04-25 | End: 2022-04-28 | Stop reason: HOSPADM

## 2022-04-25 RX ORDER — SENNOSIDES A AND B 8.6 MG/1
2 TABLET, FILM COATED ORAL DAILY
Status: DISCONTINUED | OUTPATIENT
Start: 2022-04-25 | End: 2022-04-28 | Stop reason: HOSPADM

## 2022-04-25 RX ORDER — TRAMADOL HYDROCHLORIDE 50 MG/1
50 TABLET ORAL EVERY 12 HOURS PRN
Status: DISCONTINUED | OUTPATIENT
Start: 2022-04-25 | End: 2022-04-28 | Stop reason: HOSPADM

## 2022-04-25 RX ORDER — PREDNISONE 2.5 MG/1
2.5 TABLET ORAL 2 TIMES DAILY
Status: DISCONTINUED | OUTPATIENT
Start: 2022-04-25 | End: 2022-04-28 | Stop reason: HOSPADM

## 2022-04-25 RX ORDER — LORAZEPAM 1 MG/1
1 TABLET ORAL EVERY 12 HOURS PRN
Status: DISCONTINUED | OUTPATIENT
Start: 2022-04-25 | End: 2022-04-28 | Stop reason: HOSPADM

## 2022-04-25 RX ORDER — HEPARIN SODIUM 5000 [USP'U]/ML
5000 INJECTION, SOLUTION INTRAVENOUS; SUBCUTANEOUS EVERY 8 HOURS SCHEDULED
Status: DISCONTINUED | OUTPATIENT
Start: 2022-04-25 | End: 2022-04-28 | Stop reason: HOSPADM

## 2022-04-25 RX ORDER — URSODIOL 300 MG/1
300 CAPSULE ORAL 2 TIMES DAILY
Status: DISCONTINUED | OUTPATIENT
Start: 2022-04-25 | End: 2022-04-26

## 2022-04-25 RX ADMIN — IOHEXOL 25 ML: 300 INJECTION, SOLUTION INTRAVENOUS at 12:50

## 2022-04-25 RX ADMIN — GABAPENTIN 300 MG: 300 CAPSULE ORAL at 18:54

## 2022-04-25 RX ADMIN — SODIUM CHLORIDE, PRESERVATIVE FREE 2 ML: 5 INJECTION INTRAVENOUS at 22:41

## 2022-04-25 RX ADMIN — LEVETIRACETAM 2000 MG: 100 SOLUTION ORAL at 22:24

## 2022-04-25 RX ADMIN — MYCOPHENOLATE MOFETIL 500 MG: 200 POWDER, FOR SUSPENSION ORAL at 22:23

## 2022-04-25 RX ADMIN — CARVEDILOL 6.25 MG: 6.25 TABLET, FILM COATED ORAL at 18:55

## 2022-04-25 RX ADMIN — HEPARIN SODIUM 5000 UNITS: 5000 INJECTION INTRAVENOUS; SUBCUTANEOUS at 22:23

## 2022-04-25 RX ADMIN — ATORVASTATIN CALCIUM 40 MG: 40 TABLET, FILM COATED ORAL at 22:24

## 2022-04-25 RX ADMIN — GABAPENTIN 300 MG: 300 CAPSULE ORAL at 22:22

## 2022-04-25 RX ADMIN — SODIUM CHLORIDE, PRESERVATIVE FREE 2 ML: 5 INJECTION INTRAVENOUS at 12:42

## 2022-04-25 RX ADMIN — DOXAZOSIN 2 MG: 1 TABLET ORAL at 22:24

## 2022-04-25 RX ADMIN — PREDNISONE 2.5 MG: 2.5 TABLET ORAL at 22:23

## 2022-04-25 RX ADMIN — PANTOPRAZOLE 40 MG: 40 TABLET, DELAYED RELEASE ORAL at 22:23

## 2022-04-25 RX ADMIN — MIRTAZAPINE 30 MG: 15 TABLET, FILM COATED ORAL at 22:23

## 2022-04-25 ASSESSMENT — ENCOUNTER SYMPTOMS
PHOTOPHOBIA: 0
SORE THROAT: 0
CHILLS: 0
ABDOMINAL PAIN: 0
CHEST TIGHTNESS: 0
AGITATION: 0
SHORTNESS OF BREATH: 0
FEVER: 0
ACTIVITY CHANGE: 0
GASTROINTESTINAL NEGATIVE: 1
COUGH: 0

## 2022-04-25 ASSESSMENT — ACTIVITIES OF DAILY LIVING (ADL)
CONTINENCE: DEPENDENT
RECENT_DECLINE_ADL: NO
BATHING: DEPENDENT
MOBILITY_ASSIST_DEVICES: TOTAL LIFT
ADL_SHORT_OF_BREATH: NO
DRESSING YOURSELF: DEPENDENT
CHRONIC_PAIN_PRESENT: NO
ADL_BEFORE_ADMISSION: NEEDS/REQUIRES ASSISTANCE
TOILETING: DEPENDENT
FEEDING YOURSELF: DEPENDENT
ADL_SCORE: 0
TRANSFERRING: DEPENDENT

## 2022-04-25 ASSESSMENT — COGNITIVE AND FUNCTIONAL STATUS - GENERAL
BECAUSE OF A PHYSICAL, MENTAL, OR EMOTIONAL CONDITION, DO YOU HAVE SERIOUS DIFFICULTY CONCENTRATING, REMEMBERING OR MAKING DECISIONS: YES
ARE YOU DEAF OR DO YOU HAVE SERIOUS DIFFICULTY  HEARING: NO
BECAUSE OF A PHYSICAL, MENTAL, OR EMOTIONAL CONDITION, DO YOU HAVE DIFFICULTY DOING ERRANDS ALONE: YES
DO YOU HAVE DIFFICULTY DRESSING OR BATHING: YES
ARE YOU BLIND OR DO YOU HAVE SERIOUS DIFFICULTY SEEING, EVEN WHEN WEARING GLASSES: NO
DO YOU HAVE SERIOUS DIFFICULTY WALKING OR CLIMBING STAIRS: YES

## 2022-04-25 ASSESSMENT — PAIN SCALES - GENERAL
PAINLEVEL_OUTOF10: 0
PAINLEVEL_OUTOF10: 0

## 2022-04-25 ASSESSMENT — LIFESTYLE VARIABLES
ALCOHOL_USE_STATUS: NO OR LOW RISK WITH VALIDATED TOOL
HOW OFTEN DO YOU HAVE 6 OR MORE DRINKS ON ONE OCCASION: NEVER
AUDIT-C TOTAL SCORE: 0
HOW MANY STANDARD DRINKS CONTAINING ALCOHOL DO YOU HAVE ON A TYPICAL DAY: 0,1 OR 2
HOW OFTEN DO YOU HAVE A DRINK CONTAINING ALCOHOL: NEVER

## 2022-04-26 LAB
ALBUMIN SERPL-MCNC: 2.9 G/DL (ref 3.6–5.1)
ALBUMIN/GLOB SERPL: 0.6 {RATIO} (ref 1–2.4)
ALP SERPL-CCNC: 116 UNITS/L (ref 45–117)
ALT SERPL-CCNC: 28 UNITS/L
ANION GAP SERPL CALC-SCNC: 6 MMOL/L (ref 10–20)
AST SERPL-CCNC: 10 UNITS/L
BASOPHILS # BLD: 0 K/MCL (ref 0–0.3)
BASOPHILS NFR BLD: 0 %
BILIRUB SERPL-MCNC: 0.4 MG/DL (ref 0.2–1)
BUN SERPL-MCNC: 35 MG/DL (ref 6–20)
BUN/CREAT SERPL: 37 (ref 7–25)
CALCIUM SERPL-MCNC: 9.7 MG/DL (ref 8.4–10.2)
CHLORIDE SERPL-SCNC: 112 MMOL/L (ref 98–107)
CO2 SERPL-SCNC: 32 MMOL/L (ref 21–32)
CREAT SERPL-MCNC: 0.95 MG/DL (ref 0.67–1.17)
DEPRECATED RDW RBC: 54.7 FL (ref 39–50)
EOSINOPHIL # BLD: 0.3 K/MCL (ref 0–0.5)
EOSINOPHIL NFR BLD: 6 %
ERYTHROCYTE [DISTWIDTH] IN BLOOD: 16 % (ref 11–15)
FASTING DURATION TIME PATIENT: ABNORMAL H
GFR SERPLBLD BASED ON 1.73 SQ M-ARVRAT: 89 ML/MIN
GLOBULIN SER-MCNC: 4.6 G/DL (ref 2–4)
GLUCOSE BLDC GLUCOMTR-MCNC: 126 MG/DL (ref 70–99)
GLUCOSE BLDC GLUCOMTR-MCNC: 132 MG/DL (ref 70–99)
GLUCOSE BLDC GLUCOMTR-MCNC: 133 MG/DL (ref 70–99)
GLUCOSE SERPL-MCNC: 131 MG/DL (ref 70–99)
HCT VFR BLD CALC: 36 % (ref 39–51)
HGB BLD-MCNC: 10.6 G/DL (ref 13–17)
IMM GRANULOCYTES # BLD AUTO: 0 K/MCL (ref 0–0.2)
IMM GRANULOCYTES # BLD: 0 %
LYMPHOCYTES # BLD: 1.7 K/MCL (ref 1–4)
LYMPHOCYTES NFR BLD: 32 %
MCH RBC QN AUTO: 28 PG (ref 26–34)
MCHC RBC AUTO-ENTMCNC: 29.4 G/DL (ref 32–36.5)
MCV RBC AUTO: 95.2 FL (ref 78–100)
MONOCYTES # BLD: 0.5 K/MCL (ref 0.3–0.9)
MONOCYTES NFR BLD: 9 %
NEUTROPHILS # BLD: 2.8 K/MCL (ref 1.8–7.7)
NEUTROPHILS NFR BLD: 53 %
NRBC BLD MANUAL-RTO: 0 /100 WBC
PLATELET # BLD AUTO: 202 K/MCL (ref 140–450)
POTASSIUM SERPL-SCNC: 4.9 MMOL/L (ref 3.4–5.1)
PROT SERPL-MCNC: 7.5 G/DL (ref 6.4–8.2)
RBC # BLD: 3.78 MIL/MCL (ref 4.5–5.9)
SODIUM SERPL-SCNC: 145 MMOL/L (ref 135–145)
WBC # BLD: 5.2 K/MCL (ref 4.2–11)

## 2022-04-26 PROCEDURE — 80053 COMPREHEN METABOLIC PANEL: CPT | Performed by: INTERNAL MEDICINE

## 2022-04-26 PROCEDURE — 82962 GLUCOSE BLOOD TEST: CPT

## 2022-04-26 PROCEDURE — G0378 HOSPITAL OBSERVATION PER HR: HCPCS

## 2022-04-26 PROCEDURE — 10002803 HB RX 637: Performed by: INTERNAL MEDICINE

## 2022-04-26 PROCEDURE — 85025 COMPLETE CBC W/AUTO DIFF WBC: CPT | Performed by: INTERNAL MEDICINE

## 2022-04-26 PROCEDURE — 10004651 HB RX, NO CHARGE ITEM: Performed by: INTERNAL MEDICINE

## 2022-04-26 PROCEDURE — 96372 THER/PROPH/DIAG INJ SC/IM: CPT

## 2022-04-26 PROCEDURE — 10002800 HB RX 250 W HCPCS: Performed by: INTERNAL MEDICINE

## 2022-04-26 PROCEDURE — 10002801 HB RX 250 W/O HCPCS: Performed by: INTERNAL MEDICINE

## 2022-04-26 PROCEDURE — 36415 COLL VENOUS BLD VENIPUNCTURE: CPT | Performed by: INTERNAL MEDICINE

## 2022-04-26 RX ADMIN — CARVEDILOL 6.25 MG: 6.25 TABLET, FILM COATED ORAL at 08:24

## 2022-04-26 RX ADMIN — TACROLIMUS 3 MG: 5 CAPSULE ORAL at 00:44

## 2022-04-26 RX ADMIN — HEPARIN SODIUM 5000 UNITS: 5000 INJECTION INTRAVENOUS; SUBCUTANEOUS at 06:21

## 2022-04-26 RX ADMIN — MYCOPHENOLATE MOFETIL 500 MG: 200 POWDER, FOR SUSPENSION ORAL at 08:23

## 2022-04-26 RX ADMIN — URSODIOL 300 MG: 300 CAPSULE ORAL at 00:44

## 2022-04-26 RX ADMIN — TACROLIMUS 3 MG: 5 CAPSULE ORAL at 23:01

## 2022-04-26 RX ADMIN — GABAPENTIN 300 MG: 300 CAPSULE ORAL at 13:39

## 2022-04-26 RX ADMIN — PREDNISONE 2.5 MG: 2.5 TABLET ORAL at 21:24

## 2022-04-26 RX ADMIN — LEVETIRACETAM 2000 MG: 100 SOLUTION ORAL at 21:27

## 2022-04-26 RX ADMIN — CARVEDILOL 6.25 MG: 6.25 TABLET, FILM COATED ORAL at 17:40

## 2022-04-26 RX ADMIN — LEVETIRACETAM 2000 MG: 100 SOLUTION ORAL at 08:24

## 2022-04-26 RX ADMIN — PANTOPRAZOLE 40 MG: 40 TABLET, DELAYED RELEASE ORAL at 08:24

## 2022-04-26 RX ADMIN — ATORVASTATIN CALCIUM 40 MG: 40 TABLET, FILM COATED ORAL at 21:27

## 2022-04-26 RX ADMIN — SENNOSIDES 17.2 MG: 8.6 TABLET ORAL at 08:24

## 2022-04-26 RX ADMIN — GABAPENTIN 300 MG: 300 CAPSULE ORAL at 17:40

## 2022-04-26 RX ADMIN — DOXAZOSIN 2 MG: 1 TABLET ORAL at 21:27

## 2022-04-26 RX ADMIN — MYCOPHENOLATE MOFETIL 500 MG: 200 POWDER, FOR SUSPENSION ORAL at 23:01

## 2022-04-26 RX ADMIN — LEVOTHYROXINE SODIUM 175 MCG: 100 TABLET ORAL at 08:24

## 2022-04-26 RX ADMIN — TACROLIMUS 3 MG: 5 CAPSULE ORAL at 08:23

## 2022-04-26 RX ADMIN — SODIUM CHLORIDE, PRESERVATIVE FREE 2 ML: 5 INJECTION INTRAVENOUS at 08:26

## 2022-04-26 RX ADMIN — HEPARIN SODIUM 5000 UNITS: 5000 INJECTION INTRAVENOUS; SUBCUTANEOUS at 21:27

## 2022-04-26 RX ADMIN — PANTOPRAZOLE 40 MG: 40 TABLET, DELAYED RELEASE ORAL at 21:28

## 2022-04-26 RX ADMIN — GABAPENTIN 300 MG: 300 CAPSULE ORAL at 21:27

## 2022-04-26 RX ADMIN — GABAPENTIN 300 MG: 300 CAPSULE ORAL at 08:24

## 2022-04-26 RX ADMIN — URSODIOL 300 MG: 300 CAPSULE ORAL at 21:27

## 2022-04-26 RX ADMIN — HEPARIN SODIUM 5000 UNITS: 5000 INJECTION INTRAVENOUS; SUBCUTANEOUS at 13:39

## 2022-04-26 RX ADMIN — Medication 400 MG: at 08:24

## 2022-04-26 RX ADMIN — MIRTAZAPINE 30 MG: 15 TABLET, FILM COATED ORAL at 21:27

## 2022-04-26 RX ADMIN — AMLODIPINE BESYLATE 10 MG: 10 TABLET ORAL at 08:24

## 2022-04-26 RX ADMIN — PREDNISONE 2.5 MG: 2.5 TABLET ORAL at 08:22

## 2022-04-26 RX ADMIN — POLYETHYLENE GLYCOL 3350 17 G: 17 POWDER, FOR SOLUTION ORAL at 08:24

## 2022-04-26 RX ADMIN — URSODIOL 300 MG: 300 CAPSULE ORAL at 11:22

## 2022-04-26 ASSESSMENT — PAIN SCALES - PAIN ASSESSMENT IN ADVANCED DEMENTIA (PAINAD)
CONSOLABILITY: NO NEED TO CONSOLE
FACIALEXPRESSION: SMILING OR INEXPRESSIVE
BODYLANGUAGE: RELAXED
BREATHING: NORMAL
TOTALSCORE: 0

## 2022-04-26 ASSESSMENT — PAIN SCALES - GENERAL: PAINLEVEL_OUTOF10: 0

## 2022-04-27 LAB
GLUCOSE BLDC GLUCOMTR-MCNC: 128 MG/DL (ref 70–99)
GLUCOSE BLDC GLUCOMTR-MCNC: 131 MG/DL (ref 70–99)
GLUCOSE BLDC GLUCOMTR-MCNC: 132 MG/DL (ref 70–99)
GLUCOSE BLDC GLUCOMTR-MCNC: 141 MG/DL (ref 70–99)
GLUCOSE BLDC GLUCOMTR-MCNC: 146 MG/DL (ref 70–99)

## 2022-04-27 PROCEDURE — 10002803 HB RX 637: Performed by: HOSPITALIST

## 2022-04-27 PROCEDURE — 10002803 HB RX 637: Performed by: INTERNAL MEDICINE

## 2022-04-27 PROCEDURE — 96372 THER/PROPH/DIAG INJ SC/IM: CPT

## 2022-04-27 PROCEDURE — 10002800 HB RX 250 W HCPCS: Performed by: INTERNAL MEDICINE

## 2022-04-27 PROCEDURE — G0378 HOSPITAL OBSERVATION PER HR: HCPCS

## 2022-04-27 PROCEDURE — 82962 GLUCOSE BLOOD TEST: CPT

## 2022-04-27 PROCEDURE — 10002801 HB RX 250 W/O HCPCS: Performed by: INTERNAL MEDICINE

## 2022-04-27 RX ORDER — PSEUDOEPHEDRINE HCL 30 MG
60 TABLET ORAL EVERY 6 HOURS PRN
Status: DISCONTINUED | OUTPATIENT
Start: 2022-04-27 | End: 2022-04-28 | Stop reason: HOSPADM

## 2022-04-27 RX ORDER — CODEINE PHOSPHATE AND GUAIFENESIN 10; 100 MG/5ML; MG/5ML
5 SOLUTION ORAL EVERY 4 HOURS PRN
Status: DISCONTINUED | OUTPATIENT
Start: 2022-04-27 | End: 2022-04-28 | Stop reason: HOSPADM

## 2022-04-27 RX ADMIN — URSODIOL 300 MG: 300 CAPSULE ORAL at 08:58

## 2022-04-27 RX ADMIN — PANTOPRAZOLE 40 MG: 40 TABLET, DELAYED RELEASE ORAL at 08:42

## 2022-04-27 RX ADMIN — DOXAZOSIN 2 MG: 1 TABLET ORAL at 20:21

## 2022-04-27 RX ADMIN — MYCOPHENOLATE MOFETIL 500 MG: 200 POWDER, FOR SUSPENSION ORAL at 08:45

## 2022-04-27 RX ADMIN — MYCOPHENOLATE MOFETIL 500 MG: 200 POWDER, FOR SUSPENSION ORAL at 20:24

## 2022-04-27 RX ADMIN — TACROLIMUS 3 MG: 5 CAPSULE ORAL at 08:43

## 2022-04-27 RX ADMIN — PANTOPRAZOLE 40 MG: 40 TABLET, DELAYED RELEASE ORAL at 20:21

## 2022-04-27 RX ADMIN — HEPARIN SODIUM 5000 UNITS: 5000 INJECTION INTRAVENOUS; SUBCUTANEOUS at 06:00

## 2022-04-27 RX ADMIN — GABAPENTIN 300 MG: 300 CAPSULE ORAL at 17:17

## 2022-04-27 RX ADMIN — ATORVASTATIN CALCIUM 40 MG: 40 TABLET, FILM COATED ORAL at 20:21

## 2022-04-27 RX ADMIN — GABAPENTIN 300 MG: 300 CAPSULE ORAL at 12:58

## 2022-04-27 RX ADMIN — LEVETIRACETAM 2000 MG: 100 SOLUTION ORAL at 20:21

## 2022-04-27 RX ADMIN — SENNOSIDES 17.2 MG: 8.6 TABLET ORAL at 08:43

## 2022-04-27 RX ADMIN — PREDNISONE 2.5 MG: 2.5 TABLET ORAL at 20:29

## 2022-04-27 RX ADMIN — HEPARIN SODIUM 5000 UNITS: 5000 INJECTION INTRAVENOUS; SUBCUTANEOUS at 22:22

## 2022-04-27 RX ADMIN — TACROLIMUS 3 MG: 5 CAPSULE ORAL at 20:26

## 2022-04-27 RX ADMIN — LEVOTHYROXINE SODIUM 175 MCG: 100 TABLET ORAL at 08:43

## 2022-04-27 RX ADMIN — PSEUDOEPHEDRINE HCL 60 MG: 30 TABLET, FILM COATED ORAL at 16:19

## 2022-04-27 RX ADMIN — PSEUDOEPHEDRINE HCL 60 MG: 30 TABLET, FILM COATED ORAL at 23:10

## 2022-04-27 RX ADMIN — LEVETIRACETAM 2000 MG: 100 SOLUTION ORAL at 08:42

## 2022-04-27 RX ADMIN — AMLODIPINE BESYLATE 10 MG: 10 TABLET ORAL at 08:43

## 2022-04-27 RX ADMIN — URSODIOL 300 MG: 300 CAPSULE ORAL at 20:40

## 2022-04-27 RX ADMIN — CARVEDILOL 6.25 MG: 6.25 TABLET, FILM COATED ORAL at 17:17

## 2022-04-27 RX ADMIN — PREDNISONE 2.5 MG: 2.5 TABLET ORAL at 08:44

## 2022-04-27 RX ADMIN — HEPARIN SODIUM 5000 UNITS: 5000 INJECTION INTRAVENOUS; SUBCUTANEOUS at 12:58

## 2022-04-27 RX ADMIN — Medication 400 MG: at 08:43

## 2022-04-27 RX ADMIN — GUAIFENESIN AND CODEINE PHOSPHATE 5 ML: 100; 10 SOLUTION ORAL at 22:21

## 2022-04-27 RX ADMIN — GABAPENTIN 300 MG: 300 CAPSULE ORAL at 20:21

## 2022-04-27 RX ADMIN — MIRTAZAPINE 30 MG: 15 TABLET, FILM COATED ORAL at 20:21

## 2022-04-27 RX ADMIN — POLYETHYLENE GLYCOL 3350 17 G: 17 POWDER, FOR SOLUTION ORAL at 08:43

## 2022-04-27 RX ADMIN — GABAPENTIN 300 MG: 300 CAPSULE ORAL at 08:43

## 2022-04-27 RX ADMIN — CARVEDILOL 6.25 MG: 6.25 TABLET, FILM COATED ORAL at 08:43

## 2022-04-27 RX ADMIN — GUAIFENESIN AND CODEINE PHOSPHATE 5 ML: 100; 10 SOLUTION ORAL at 17:17

## 2022-04-27 ASSESSMENT — PAIN SCALES - PAIN ASSESSMENT IN ADVANCED DEMENTIA (PAINAD)
BREATHING: NORMAL
FACIALEXPRESSION: SMILING OR INEXPRESSIVE
TOTALSCORE: 0
TOTALSCORE: 0
CONSOLABILITY: NO NEED TO CONSOLE
BODYLANGUAGE: RELAXED
BREATHING: NORMAL
FACIALEXPRESSION: SMILING OR INEXPRESSIVE
CONSOLABILITY: NO NEED TO CONSOLE
BODYLANGUAGE: RELAXED

## 2022-04-27 ASSESSMENT — MOVEMENT AND STRENGTH ASSESSMENTS
LUE_ASSESSMENT: FAIR/COMPLETE ROM AGAINST GRAVITY, NO ADDED RESISTANCE
RLE_ASSESSMENT: POOR/COMPLETE ROM WITH GRAVITY ELIMINATED
RUE_ASSESSMENT: FAIR/COMPLETE ROM AGAINST GRAVITY, NO ADDED RESISTANCE
LLE_ASSESSMENT: POOR/COMPLETE ROM WITH GRAVITY ELIMINATED

## 2022-04-28 VITALS
SYSTOLIC BLOOD PRESSURE: 132 MMHG | TEMPERATURE: 98.1 F | BODY MASS INDEX: 35.11 KG/M2 | HEART RATE: 69 BPM | DIASTOLIC BLOOD PRESSURE: 74 MMHG | RESPIRATION RATE: 18 BRPM | WEIGHT: 244.71 LBS | OXYGEN SATURATION: 95 %

## 2022-04-28 LAB
ANION GAP SERPL CALC-SCNC: 8 MMOL/L (ref 10–20)
BASOPHILS # BLD: 0 K/MCL (ref 0–0.3)
BASOPHILS NFR BLD: 1 %
BUN SERPL-MCNC: 29 MG/DL (ref 6–20)
BUN/CREAT SERPL: 30 (ref 7–25)
CALCIUM SERPL-MCNC: 9.2 MG/DL (ref 8.4–10.2)
CHLORIDE SERPL-SCNC: 107 MMOL/L (ref 98–107)
CO2 SERPL-SCNC: 33 MMOL/L (ref 21–32)
CREAT SERPL-MCNC: 0.97 MG/DL (ref 0.67–1.17)
DEPRECATED RDW RBC: 55.8 FL (ref 39–50)
EOSINOPHIL # BLD: 0.2 K/MCL (ref 0–0.5)
EOSINOPHIL NFR BLD: 5 %
ERYTHROCYTE [DISTWIDTH] IN BLOOD: 16.1 % (ref 11–15)
FASTING DURATION TIME PATIENT: ABNORMAL H
GFR SERPLBLD BASED ON 1.73 SQ M-ARVRAT: 87 ML/MIN
GLUCOSE BLDC GLUCOMTR-MCNC: 125 MG/DL (ref 70–99)
GLUCOSE SERPL-MCNC: 139 MG/DL (ref 70–99)
HCT VFR BLD CALC: 32.8 % (ref 39–51)
HGB BLD-MCNC: 9.6 G/DL (ref 13–17)
IMM GRANULOCYTES # BLD AUTO: 0 K/MCL (ref 0–0.2)
IMM GRANULOCYTES # BLD: 0 %
LYMPHOCYTES # BLD: 1.5 K/MCL (ref 1–4)
LYMPHOCYTES NFR BLD: 34 %
MCH RBC QN AUTO: 27.6 PG (ref 26–34)
MCHC RBC AUTO-ENTMCNC: 29.3 G/DL (ref 32–36.5)
MCV RBC AUTO: 94.3 FL (ref 78–100)
MONOCYTES # BLD: 0.4 K/MCL (ref 0.3–0.9)
MONOCYTES NFR BLD: 10 %
NEUTROPHILS # BLD: 2.2 K/MCL (ref 1.8–7.7)
NEUTROPHILS NFR BLD: 50 %
NRBC BLD MANUAL-RTO: 0 /100 WBC
PLATELET # BLD AUTO: 195 K/MCL (ref 140–450)
POTASSIUM SERPL-SCNC: 4.3 MMOL/L (ref 3.4–5.1)
RBC # BLD: 3.48 MIL/MCL (ref 4.5–5.9)
SODIUM SERPL-SCNC: 144 MMOL/L (ref 135–145)
WBC # BLD: 4.4 K/MCL (ref 4.2–11)

## 2022-04-28 PROCEDURE — 85025 COMPLETE CBC W/AUTO DIFF WBC: CPT | Performed by: HOSPITALIST

## 2022-04-28 PROCEDURE — 10002803 HB RX 637: Performed by: INTERNAL MEDICINE

## 2022-04-28 PROCEDURE — 51701 INSERT BLADDER CATHETER: CPT

## 2022-04-28 PROCEDURE — 80048 BASIC METABOLIC PNL TOTAL CA: CPT | Performed by: HOSPITALIST

## 2022-04-28 PROCEDURE — 10002803 HB RX 637: Performed by: HOSPITALIST

## 2022-04-28 PROCEDURE — G0378 HOSPITAL OBSERVATION PER HR: HCPCS

## 2022-04-28 PROCEDURE — 96372 THER/PROPH/DIAG INJ SC/IM: CPT

## 2022-04-28 PROCEDURE — 82962 GLUCOSE BLOOD TEST: CPT

## 2022-04-28 PROCEDURE — 10002801 HB RX 250 W/O HCPCS: Performed by: INTERNAL MEDICINE

## 2022-04-28 PROCEDURE — 10002800 HB RX 250 W HCPCS: Performed by: INTERNAL MEDICINE

## 2022-04-28 PROCEDURE — 36415 COLL VENOUS BLD VENIPUNCTURE: CPT | Performed by: HOSPITALIST

## 2022-04-28 RX ORDER — AMOXICILLIN AND CLAVULANATE POTASSIUM 875; 125 MG/1; MG/1
1 TABLET, FILM COATED ORAL 2 TIMES DAILY
Qty: 20 TABLET | Refills: 0 | Status: SHIPPED
Start: 2022-04-28 | End: 2022-05-03

## 2022-04-28 RX ADMIN — MYCOPHENOLATE MOFETIL 500 MG: 200 POWDER, FOR SUSPENSION ORAL at 08:54

## 2022-04-28 RX ADMIN — POLYETHYLENE GLYCOL 3350 17 G: 17 POWDER, FOR SOLUTION ORAL at 08:52

## 2022-04-28 RX ADMIN — SENNOSIDES 17.2 MG: 8.6 TABLET ORAL at 08:51

## 2022-04-28 RX ADMIN — PANTOPRAZOLE 40 MG: 40 TABLET, DELAYED RELEASE ORAL at 09:11

## 2022-04-28 RX ADMIN — LEVOTHYROXINE SODIUM 175 MCG: 100 TABLET ORAL at 08:51

## 2022-04-28 RX ADMIN — LEVETIRACETAM 2000 MG: 100 SOLUTION ORAL at 08:52

## 2022-04-28 RX ADMIN — HEPARIN SODIUM 5000 UNITS: 5000 INJECTION INTRAVENOUS; SUBCUTANEOUS at 05:10

## 2022-04-28 RX ADMIN — GABAPENTIN 300 MG: 300 CAPSULE ORAL at 08:51

## 2022-04-28 RX ADMIN — Medication 400 MG: at 08:51

## 2022-04-28 RX ADMIN — AMLODIPINE BESYLATE 10 MG: 10 TABLET ORAL at 08:51

## 2022-04-28 RX ADMIN — CARVEDILOL 6.25 MG: 6.25 TABLET, FILM COATED ORAL at 08:51

## 2022-04-28 RX ADMIN — TACROLIMUS 3 MG: 5 CAPSULE ORAL at 08:53

## 2022-04-28 RX ADMIN — URSODIOL 300 MG: 300 CAPSULE ORAL at 08:52

## 2022-04-28 RX ADMIN — PREDNISONE 2.5 MG: 2.5 TABLET ORAL at 08:51

## 2022-04-28 RX ADMIN — PSEUDOEPHEDRINE HCL 60 MG: 30 TABLET, FILM COATED ORAL at 08:53

## 2022-04-28 ASSESSMENT — PAIN SCALES - PAIN ASSESSMENT IN ADVANCED DEMENTIA (PAINAD)
CONSOLABILITY: NO NEED TO CONSOLE
BODYLANGUAGE: RELAXED
TOTALSCORE: 0
BREATHING: NORMAL
FACIALEXPRESSION: SMILING OR INEXPRESSIVE

## 2022-04-28 ASSESSMENT — MOVEMENT AND STRENGTH ASSESSMENTS
LLE_ASSESSMENT: POOR/COMPLETE ROM WITH GRAVITY ELIMINATED
RLE_ASSESSMENT: POOR/COMPLETE ROM WITH GRAVITY ELIMINATED
LUE_ASSESSMENT: FAIR/COMPLETE ROM AGAINST GRAVITY, NO ADDED RESISTANCE
RUE_ASSESSMENT: FAIR/COMPLETE ROM AGAINST GRAVITY, NO ADDED RESISTANCE

## 2024-08-29 NOTE — CM/SW NOTE
[facility] does not offer community pc. [facility] contracst with [facility]. SW /CM to fax referral and order to: [phone] closer to [place].     Casandra Farrell LCSW  /Discharge Planner  [phone] Detail Level: Zone Render Risk Assessment In Note?: no Additional Notes: 2.5mg/cc kenalog IL on chin- 0.1cc total \\n10mg/cc kenalog on chest and navel 2cc total

## (undated) DEVICE — ENDOSCOPY PACK UPPER: Brand: MEDLINE INDUSTRIES, INC.

## (undated) DEVICE — MEDI-VAC SUCTION HANDLE REGULAR CAPACITY: Brand: CARDINAL HEALTH

## (undated) DEVICE — 1200CC GUARDIAN II: Brand: GUARDIAN

## (undated) DEVICE — MEDI-VAC NON-CONDUCTIVE SUCTION TUBING: Brand: CARDINAL HEALTH

## (undated) DEVICE — Device: Brand: DEFENDO AIR/WATER/SUCTION AND BIOPSY VALVE

## (undated) DEVICE — ENDOSCOPY PACK - LOWER: Brand: MEDLINE INDUSTRIES, INC.

## (undated) DEVICE — FILTERLINE NASAL ADULT O2/CO2

## (undated) DEVICE — 3M™ RED DOT™ MONITORING ELECTRODE WITH FOAM TAPE AND STICKY GEL, 50/BAG, 20/CASE, 72/PLT 2570: Brand: RED DOT™

## (undated) NOTE — IP AVS SNAPSHOT
[address]            (For Outpatient Use Only) Initial Admit Date: 9/25/2020   Inpt/Obs Admit Date: Inpt: 9/25/20 / Obs: N/A   Discharge Date:    Nuzhat Copier:  [de-identified]   MRN: [de-identified]   CSN: [id number]   CEID: [id number] Subscriber Name:  Abimael Singh :    Subscriber ID:  Pt Rel to Subscriber:    Hospital Account Financial Class: Medicare Advantage    2020

## (undated) NOTE — LETTER
Deb Rico [address]  [address] [facility] [address] [facility]  Authorization for Surgical Operation and Procedure     Date:___________                                                                                                         Time:__________ 4.   Should the need arise during my operation or immediate post-operative period, I also consent to the administration of blood and/or blood products.   Further, I understand that despite careful testing and screening of blood or blood products by russell 8.   I recognize that in the event my procedure results in extended X-Ray/fluoroscopy time, I may develop a skin reaction. 9.  If I have a Do Not Attempt Resuscitation (DNAR) order in place, that status will be suspended while in the operating room, proc 1. IShira agree to be cared for by an anesthesiologist, who is specially trained to monitor me and give me medicine to put me to sleep or keep me comfortable during my procedure    I understand that my anesthesiologist is not an employee or age 5. My doctor has explained to me other choices available to me for my care (alternatives).   6. Pregnant Patients (“epidural”):  I understand that the risks of having an epidural (medicine given into my back to help control pain during labor), include itchi

## (undated) NOTE — LETTER
Deb Rico 89  [address] [facility] [address], [address] [facility]  Authorization for Surgical Operation and Procedure     Date:___________                                                                                                         Time:__________ 4.   Should the need arise during my operation or immediate post-operative period, I also consent to the administration of blood and/or blood products.   Further, I understand that despite careful testing and screening of blood or blood products by russell 8.   I recognize that in the event my procedure results in extended X-Ray/fluoroscopy time, I may develop a skin reaction. 9.  If I have a Do Not Attempt Resuscitation (DNAR) order in place, that status will be suspended while in the operating room, proc 1. IIesha agree to be cared for by an anesthesiologist, who is specially trained to monitor me and give me medicine to put me to sleep or keep me comfortable during my procedure    I understand that my anesthesiologist is not an employee or age 5. My doctor has explained to me other choices available to me for my care (alternatives).   6. Pregnant Patients (“epidural”):  I understand that the risks of having an epidural (medicine given into my back to help control pain during labor), include itchi

## (undated) NOTE — IP AVS SNAPSHOT
Patient Demographics     Address  [address] [place] [address] [address] Zay Ackerman [address] Phone  [phone] [facility]) *Preferred*  [phone] [facility])      Emergency Contact(s)     Name Relation Home Work [place], [place] Spouse   Deleontoarleen Commonly known as: PROAMATINE      Take 1 tablet (5 mg total) by mouth 3 (three) times daily. Stop taking on: November 6, 2020   Cat Carlson MD         mirtazapine 15 MG Tabs  Commonly known as: REMERON      Take 15 mg by mouth nightly.           Heidi [id number] [facility]) chewable tab 80 mg 10/07/20 1035 Given      [id number] simethicone (MYLICON) chewable tab 80 mg 10/07/20 1327 Given            LEFT UPPER ARM     Order ID Medication Name Action Time Action Reason Comments    [id number] Insul CBC, PLATELET; NO DIFFERENTIAL [[id number]] (Abnormal)  Resulted: 10/07/20 0550, Result status: Final result   Ordering provider: Kristian Duffy MD  10/07/20 0046 [facility]    Specimen Information    Type So Author: Eddie Vaughn MD Service: Hospitalist Author Type: Physician    Filed: 9/25/2020 10:02 PM Date of Service: 9/25/2020  1:15 PM Status: Addendum    : Eddie Vaughn MD (Physician)    Related Notes: Original Note by Eddie Vaughn MD ([facility] •  aspirin 81 MG Oral Tab EC, Take 81 mg by mouth daily. , Disp: , Rfl:     •  Albuterol Sulfate  (90 Base) MCG/ACT Inhalation Aero Soln, Inhale 2 puffs into the lungs every 6 (six) hours as needed for Wheezing., Disp: , Rfl:     •  carboxymethylcell /57   Pulse 75   Temp 98 °F (36.7 °C) (Temporal)   Resp 16   Ht 5' 8\" (1.727 m)   Wt (!) 340 lb (154.2 kg)   SpO2 97%   BMI 51.70 kg/m²[NB.2]   General: No acute distress. Alert and oriented x 3. HEENT: Normocephalic atraumatic.  Moist mucous Arden Chill 5. Coagulopathy-being reversed[NB.4]   6. [address] with LFT elevation   1. Will obtain records  7. DM 2 with neuropathy/CKD  1. hyperglycemia protocol  8. Recent right toe infection   1. Does not seem infected on exam, no drainage   9. Hx of HTN   10.  Right arm Per wife who is providing most history he has chronic edema but today it simore than usual. He was admitted at [facility] for 10 days for right toe infection. Per wife, their work up suggested CKD, ?  Recurrence of his [address] and right arm DVT which he has b •  rifaximin 550 MG Oral Tab, Take 550 mg by mouth 2 (two) times daily. , Disp: , Rfl:     •  mirtazapine 15 MG Oral Tab, Take 15 mg by mouth nightly., Disp: , Rfl:     •  Potassium Chloride ER 20 MEQ Oral Tab CR, Take by mouth., Disp: , Rfl:     •  Jin Burton Recent Labs   Lab 09/25/20  1154   GLU 95   BUN 37*   CREATSERUM 4.45*   GFRAA 15*   GFRNAA 13*   CA 9.6   ALB 0.9*      K 5.7*   *   CO2 24.0   ALKPHO 166*   *   ALT 43   BILT 2.3*   TP 4.8*       Estimated Creatinine Clearance: 16.7 mL : José Antonio Arcos MD (Physician)     Consult Orders    1.  Consult to Hematology [[id number]] ordered by Jose Ag MD at 09/26/20 [place] [address] [facility] Report of Consultation      Patient Name: Andrey Dewey Labs morning after admission (this morning) showed creatinine 4.84, total bilirubin 4.43, albumin 1.1, Hb 8.8, platelet 44 K/mcl, INR 3.27. [BOOKER.1]    Patient still having maroon stools but overall volume is decreased compared to before admission.[BOOKER.2] •  Insulin Aspart Pen (NOVOLOG) 100 UNIT/ML flexpen 1-5 Units, 1-5 Units, Subcutaneous, Q6H    •  cefTRIAXone Sodium (ROCEPHIN) 2 g in sodium chloride 0.9% 100 mL MBP/ADD-vantage, 2 g, Intravenous, Q24H    •  Albumin Human (ALBUMINAR) 25 % solution 25 g, 2 •  sodium bicarbonate 150mEq in dextrose 5% 1000mL premix infusion, 83 mL/hr, Intravenous, Continuous        Allergies   Mr. Kandice Dean has No Known Allergies. Review of Systems   Constitutional No fevers, chills, night sweats.   Allergic/Immunologic No r Ventricular rate 74 BPM    Atrial rate 74 BPM    P-R Interval 152 ms    QRS Duration 82 ms    Q-T Interval 430 ms    QTC Calculation (Bezet) 477 ms    P Axis 3 degrees    R Axis 33 degrees    T Axis 46 degrees   AMMONIA, PLASMA    Collection Time: 09/25/2 UNIT RH POS     Product Status Presumed Transfused     Expiration Date [id number]     Blood Type Barcode 7300    POCT GLUCOSE    Collection Time: 09/26/20  2:23 AM   Result Value Ref Range    POC Glucose 88 70 - 99 mg/dL   COMP METABOLIC PANEL (14)    C POC Glucose 99 70 - 99 mg/dL   URINALYSIS, ROUTINE    Collection Time: 09/26/20  8:54 AM   Result Value Ref Range    Urine Color Meryl (A) Yellow    Clarity Urine Clear Clear    Spec Gravity 1.012 1.001 - 1.030    Glucose Urine Negative Negative mg/dl LIVER:  There are noted to be a cluster of calcified granulomas within the right side of the dome of the right lobe of the liver, the largest of which measures 1.7 cm.   There is a nonspecific prominent hypodensity within the left lobe of the liver,   appea LUNG BASES:  There is a moderate to large right pleural effusion with passive atelectasis of the right lung base.   There is suggestion of a large lipoma within the pericardial sac posteriorly effacing the left atrium measuring approximately 5.8 x 4.9 x   9 1. [BOOKER.1]  Coagulopathy: Likely multifactorial thought the dominant etiology cannot be determined at this time. Patient elevated bilirubin on admission and markedly low albumin suggest significant liver disease with impaired synthetic function.  As I do not 2.   Anemia: Undoubtedly multifactorial including anemia due to liver disease, acute/subacute blood loss, anemia of chronic disease, and iron deficiency. Laboratory work up will be sent. Folic acid will be started.  Hb is being followed and transfusion maynor Electronically signed by:    Jeyson Rosenbaum M.D. Associate Medical Director of Oncology [facility], [facility]CK Blood[BOOKER.1]         Electronically signed by Duyen Matthews MD on 9/26/2020  2:44 PM   Attribution Key    BOOKER. 1 - Undev • Cancer [facility])     liver[BR.2]       Past Surgical History[BR.1]  Past Surgical History:   Procedure Laterality Date   • COLONOSCOPY N/A 9/30/2020    Performed by Lei Celis MD at [facility] ENDOSCOPY   • ESOPHAGOGASTRODUODENOSCOPY (EGD) N/A 9/30/2020 Skilled Therapy Provided: Pt performed strengthening ex with elinor le and ue's.  Pt declined bed mobility training stating he is able to get in and out without assist.   Sit to stand training with cues for hand placement with close supervision from the chair Met    Goal #2 Patient is able to demonstrate transfers Sit to/from Stand at assistance level: supervision  Met   Updated to mod ind.        Goal #3 Patient is able to ambulate 50 feet with assist device: walker - rolling at assistance level: supervision Hepatocellular carcinoma (HCC)    Hypotension due to hypovolemia    Renal failure, unspecified chronicity    Anemia, unspecified type    Hypotension, unspecified hypotension type    FELIX on CPAP    [facility]    Donnie Approx Degree of Impairment: 56.46%  Standardized Score (AM-PAC Scale): 34.69  CMS Modifier (G-Code): CK[AO.2]    FUNCTIONAL TRANSFER ASSESSMENT[AO.1]  Supine to Sit : Minimum assistance  Sit to Stand: Minimum assistance[AO.2]    Skilled Therapy Provided: If there is extra material, double layer over foot/ankle and never fold over at knee. To remove:   Remove the stocking by pulling over the foot like a pair of socks. Do not leave stocking pulled down around ankles as this can create a tourniquet.     Ca AO.1 - Wash Drafts, OT on 10/6/2020 10:04 AM  AO. 2 - Wash Drafts, OT on 10/6/2020 10:05 AM                     Video Swallow Study Notes    No notes of this type exist for this encounter.      SLP Notes    No notes of this type exist for this encounte

## (undated) NOTE — LETTER
Deb Rico 89  [address] [facility] [address], [address] [facility]  Authorization for Surgical Operation and Procedure     Date:_9/29/2020__________                                                                                                         Time:___ 4.   Should the need arise during my operation or immediate post-operative period, I also consent to the administration of blood and/or blood products.   Further, I understand that despite careful testing and screening of blood or blood products by russell 8.   I recognize that in the event my procedure results in extended X-Ray/fluoroscopy time, I may develop a skin reaction. 9.  If I have a Do Not Attempt Resuscitation (DNAR) order in place, that status will be suspended while in the operating room, proc 1. INadya agree to be cared for by an anesthesiologist, who is specially trained to monitor me and give me medicine to put me to sleep or keep me comfortable during my procedure    I understand that my anesthesiologist is not an employee or age 3. My doctor has explained to me other choices available to me for my care (alternatives).   6. Pregnant Patients (“epidural”):  I understand that the risks of having an epidural (medicine given into my back to help control pain during labor), include itchi

## (undated) NOTE — LETTER
Deb Rico 89  [address] [facility] [address], [address] [facility]  Authorization for Surgical Operation and Procedure     Date: 9/26/20___________                                                                                                         Time:_103 4.   Should the need arise during my operation or immediate post-operative period, I also consent to the administration of blood and/or blood products.   Further, I understand that despite careful testing and screening of blood or blood products by russell 8.   I recognize that in the event my procedure results in extended X-Ray/fluoroscopy time, I may develop a skin reaction. 9.  If I have a Do Not Attempt Resuscitation (DNAR) order in place, that status will be suspended while in the operating room, proc 1. ISunil Gave agree to be cared for by an anesthesiologist, who is specially trained to monitor me and give me medicine to put me to sleep or keep me comfortable during my procedure    I understand that my anesthesiologist is not an employee or age 3. My doctor has explained to me other choices available to me for my care (alternatives).   6. Pregnant Patients (“epidural”):  I understand that the risks of having an epidural (medicine given into my back to help control pain during labor), include itchi